# Patient Record
Sex: FEMALE | Race: WHITE | Employment: FULL TIME | URBAN - METROPOLITAN AREA
[De-identification: names, ages, dates, MRNs, and addresses within clinical notes are randomized per-mention and may not be internally consistent; named-entity substitution may affect disease eponyms.]

---

## 2017-05-30 ENCOUNTER — OFFICE VISIT (OUTPATIENT)
Dept: FAMILY MEDICINE CLINIC | Facility: CLINIC | Age: 22
End: 2017-05-30

## 2017-05-30 VITALS
RESPIRATION RATE: 16 BRPM | HEART RATE: 106 BPM | DIASTOLIC BLOOD PRESSURE: 98 MMHG | WEIGHT: 241.19 LBS | OXYGEN SATURATION: 98 % | SYSTOLIC BLOOD PRESSURE: 132 MMHG | TEMPERATURE: 98 F | HEIGHT: 67 IN | BODY MASS INDEX: 37.85 KG/M2

## 2017-05-30 DIAGNOSIS — J40 BRONCHITIS: Primary | ICD-10-CM

## 2017-05-30 DIAGNOSIS — J06.9 ACUTE URI: ICD-10-CM

## 2017-05-30 PROCEDURE — 99214 OFFICE O/P EST MOD 30 MIN: CPT | Performed by: NURSE PRACTITIONER

## 2017-05-30 RX ORDER — AZITHROMYCIN 250 MG/1
TABLET, FILM COATED ORAL
Qty: 6 TABLET | Refills: 0 | Status: SHIPPED | OUTPATIENT
Start: 2017-05-30 | End: 2017-06-04

## 2017-05-30 NOTE — PROGRESS NOTES
HPI:    Patient ID: Regi Ac is a 25year old female. HPI   Been fighting cold symptoms for at least 2 weeks. Not getting better. Bene having congestion mainly in her head. Last week moved to her chest with a bad cough. Wednesday had a fever.  Carey Govea alert and oriented to person, place, and time. Skin: Skin is warm and dry. Psychiatric: She has a normal mood and affect. Her behavior is normal. Judgment and thought content normal.   Nursing note and vitals reviewed.              ASSESSMENT/PLAN:   Monique

## 2017-05-30 NOTE — PATIENT INSTRUCTIONS
Directed to take antibiotics until gone. Recommend to eat yogurt or take probiotic daily while on antibiotic. Treat symptoms as needed. Avoid decongestants such as sudafed. Return to clinic if not better in 48-72 hours. Otherwise follow-up as needed. minutes per week. 4-9 mmHg   Moderation of alcohol consumption Men: limit to <= 2 drinks* per day. Women and lighter weight persons: limit to <= 1 drink* per day.  2-4 mmHg      * 1 drink = ½ oz or 15 mL ethanol (e.g. 12 oz beer, 5 oz wine, 1.5 oz 80-proof

## 2018-07-05 ENCOUNTER — OFFICE VISIT (OUTPATIENT)
Dept: FAMILY MEDICINE CLINIC | Facility: CLINIC | Age: 23
End: 2018-07-05

## 2018-07-05 VITALS
HEART RATE: 84 BPM | WEIGHT: 247 LBS | SYSTOLIC BLOOD PRESSURE: 120 MMHG | TEMPERATURE: 98 F | DIASTOLIC BLOOD PRESSURE: 78 MMHG | HEIGHT: 67 IN | BODY MASS INDEX: 38.77 KG/M2 | RESPIRATION RATE: 16 BRPM

## 2018-07-05 DIAGNOSIS — N92.6 IRREGULAR MENSES: ICD-10-CM

## 2018-07-05 DIAGNOSIS — Z00.00 ROUTINE GENERAL MEDICAL EXAMINATION AT A HEALTH CARE FACILITY: Primary | ICD-10-CM

## 2018-07-05 DIAGNOSIS — Z01.411 ENCOUNTER FOR GYNECOLOGICAL EXAMINATION WITH ABNORMAL FINDING: ICD-10-CM

## 2018-07-05 PROCEDURE — 88175 CYTOPATH C/V AUTO FLUID REDO: CPT | Performed by: NURSE PRACTITIONER

## 2018-07-05 PROCEDURE — 87491 CHLMYD TRACH DNA AMP PROBE: CPT | Performed by: NURSE PRACTITIONER

## 2018-07-05 PROCEDURE — 87591 N.GONORRHOEAE DNA AMP PROB: CPT | Performed by: NURSE PRACTITIONER

## 2018-07-05 PROCEDURE — 99395 PREV VISIT EST AGE 18-39: CPT | Performed by: NURSE PRACTITIONER

## 2018-07-05 RX ORDER — NORGESTIMATE AND ETHINYL ESTRADIOL 7DAYSX3 28
1 KIT ORAL DAILY
Qty: 3 PACKAGE | Refills: 3 | Status: SHIPPED | OUTPATIENT
Start: 2018-07-05 | End: 2019-05-06

## 2018-07-05 RX ORDER — AMOXICILLIN 500 MG/1
500 TABLET, FILM COATED ORAL 3 TIMES DAILY
COMMUNITY
End: 2018-08-22

## 2018-07-05 NOTE — PROGRESS NOTES
HPI:    Patient ID: Sima Castro is a 21year old female. HPI     Patient is present for a physical and her first Pap. States she has not ever been sexually active, but is getting involved in a relationship and this is being discussed.   Would like tenderness, discharge or bleeding. Cervix exhibits no motion tenderness, no discharge and no friability. Musculoskeletal: Normal range of motion. Lymphadenopathy:     She has no cervical adenopathy.    Neurological: She is alert and oriented to person,

## 2018-07-05 NOTE — PATIENT INSTRUCTIONS
Pap pending. Schedule fasting labs (nothing but water or black coffee 8 - 12 hours before labs drawn). Start birth control this Sunday. Recheck annually and as needed. Handout for birth control given.

## 2018-07-09 ENCOUNTER — TELEPHONE (OUTPATIENT)
Dept: FAMILY MEDICINE CLINIC | Facility: CLINIC | Age: 23
End: 2018-07-09

## 2018-07-09 ENCOUNTER — LABORATORY ENCOUNTER (OUTPATIENT)
Dept: LAB | Age: 23
End: 2018-07-09
Attending: NURSE PRACTITIONER
Payer: COMMERCIAL

## 2018-07-09 DIAGNOSIS — N92.6 IRREGULAR MENSES: ICD-10-CM

## 2018-07-09 DIAGNOSIS — Z00.00 ROUTINE GENERAL MEDICAL EXAMINATION AT A HEALTH CARE FACILITY: ICD-10-CM

## 2018-07-09 DIAGNOSIS — Z01.411 ENCOUNTER FOR GYNECOLOGICAL EXAMINATION WITH ABNORMAL FINDING: ICD-10-CM

## 2018-07-09 LAB
ALBUMIN SERPL-MCNC: 3.2 G/DL (ref 3.5–4.8)
ALP LIVER SERPL-CCNC: 87 U/L (ref 52–144)
ALT SERPL-CCNC: 23 U/L (ref 14–54)
AST SERPL-CCNC: 23 U/L (ref 15–41)
BASOPHILS # BLD AUTO: 0.03 X10(3) UL (ref 0–0.1)
BASOPHILS NFR BLD AUTO: 0.4 %
BILIRUB SERPL-MCNC: 0.3 MG/DL (ref 0.1–2)
BUN BLD-MCNC: 20 MG/DL (ref 8–20)
CALCIUM BLD-MCNC: 9.3 MG/DL (ref 8.3–10.3)
CHLORIDE: 109 MMOL/L (ref 101–111)
CHOLEST SMN-MCNC: 210 MG/DL (ref ?–190)
CO2: 24 MMOL/L (ref 22–32)
CREAT BLD-MCNC: 1.59 MG/DL (ref 0.55–1.02)
DEPRECATED HBV CORE AB SER IA-ACNC: 42.8 NG/ML (ref 12–114)
EOSINOPHIL # BLD AUTO: 0.7 X10(3) UL (ref 0–0.3)
EOSINOPHIL NFR BLD AUTO: 8.3 %
ERYTHROCYTE [DISTWIDTH] IN BLOOD BY AUTOMATED COUNT: 12.6 % (ref 11.5–16)
GLUCOSE BLD-MCNC: 93 MG/DL (ref 70–99)
HCT VFR BLD AUTO: 48.9 % (ref 34–50)
HDLC SERPL-MCNC: 34 MG/DL (ref 45–?)
HDLC SERPL: 6.18 {RATIO} (ref ?–4.44)
HGB BLD-MCNC: 16.1 G/DL (ref 12–16)
IMMATURE GRANULOCYTE COUNT: 0.01 X10(3) UL (ref 0–1)
IMMATURE GRANULOCYTE RATIO %: 0.1 %
IRON SATURATION: 18 % (ref 20–50)
IRON: 68 UG/DL (ref 28–170)
LDLC SERPL CALC-MCNC: 105 MG/DL (ref ?–120)
LYMPHOCYTES # BLD AUTO: 2.35 X10(3) UL (ref 0.9–4)
LYMPHOCYTES NFR BLD AUTO: 27.8 %
M PROTEIN MFR SERPL ELPH: 7.3 G/DL (ref 6.1–8.3)
MCH RBC QN AUTO: 30.6 PG (ref 27–33.2)
MCHC RBC AUTO-ENTMCNC: 32.9 G/DL (ref 31–37)
MCV RBC AUTO: 92.8 FL (ref 81–100)
MONOCYTES # BLD AUTO: 0.6 X10(3) UL (ref 0.1–1)
MONOCYTES NFR BLD AUTO: 7.1 %
NEUTROPHIL ABS PRELIM: 4.75 X10 (3) UL (ref 1.3–6.7)
NEUTROPHILS # BLD AUTO: 4.75 X10(3) UL (ref 1.3–6.7)
NEUTROPHILS NFR BLD AUTO: 56.3 %
NONHDLC SERPL-MCNC: 176 MG/DL (ref ?–150)
PLATELET # BLD AUTO: 324 10(3)UL (ref 150–450)
POTASSIUM SERPL-SCNC: 4.4 MMOL/L (ref 3.6–5.1)
RBC # BLD AUTO: 5.27 X10(6)UL (ref 3.8–5.1)
RED CELL DISTRIBUTION WIDTH-SD: 43 FL (ref 35.1–46.3)
SODIUM SERPL-SCNC: 141 MMOL/L (ref 136–144)
TOTAL IRON BINDING CAPACITY: 370 UG/DL (ref 240–450)
TRANSFERRIN: 248 MG/DL (ref 200–360)
TRIGL SERPL-MCNC: 357 MG/DL (ref ?–115)
TSI SER-ACNC: 2.43 MIU/ML (ref 0.35–5.5)
VLDLC SERPL CALC-MCNC: 71 MG/DL (ref 5–40)
WBC # BLD AUTO: 8.4 X10(3) UL (ref 4–13)

## 2018-07-09 PROCEDURE — 82728 ASSAY OF FERRITIN: CPT | Performed by: NURSE PRACTITIONER

## 2018-07-09 PROCEDURE — 83550 IRON BINDING TEST: CPT | Performed by: NURSE PRACTITIONER

## 2018-07-09 PROCEDURE — 80061 LIPID PANEL: CPT | Performed by: NURSE PRACTITIONER

## 2018-07-09 PROCEDURE — 36415 COLL VENOUS BLD VENIPUNCTURE: CPT | Performed by: NURSE PRACTITIONER

## 2018-07-09 PROCEDURE — 80050 GENERAL HEALTH PANEL: CPT | Performed by: NURSE PRACTITIONER

## 2018-07-09 PROCEDURE — 83540 ASSAY OF IRON: CPT | Performed by: NURSE PRACTITIONER

## 2018-07-09 NOTE — TELEPHONE ENCOUNTER
----- Message from Sherren Portela, APN sent at 7/9/2018 11:18 AM CDT -----  Pap is normal. Gonorrhea and chlamydia is pending. Please let patient know. Thank you.  Marleni Avalos, 07/09/18, 11:18 AM

## 2018-07-09 NOTE — TELEPHONE ENCOUNTER
Spoke with Tish London in lab who will add on [facility] to pap specimen. Will contact patient once those results are back along with lab results.

## 2018-07-10 LAB
C TRACH DNA SPEC QL NAA+PROBE: NEGATIVE
N GONORRHOEA DNA SPEC QL NAA+PROBE: NEGATIVE

## 2018-07-10 NOTE — TELEPHONE ENCOUNTER
----- Message from TALYA Gamboa sent at 7/10/2018 12:19 PM CDT -----  Labs show with CMP I decrease in kidney function. Recommend to increase water intake. Also iron saturation is slightly low. Recommend an iron rich diet.   Cholesterol levels ar

## 2018-08-22 ENCOUNTER — APPOINTMENT (OUTPATIENT)
Dept: LAB | Age: 23
End: 2018-08-22
Attending: NURSE PRACTITIONER
Payer: COMMERCIAL

## 2018-08-22 ENCOUNTER — OFFICE VISIT (OUTPATIENT)
Dept: FAMILY MEDICINE CLINIC | Facility: CLINIC | Age: 23
End: 2018-08-22
Payer: COMMERCIAL

## 2018-08-22 VITALS
TEMPERATURE: 100 F | WEIGHT: 239.38 LBS | HEART RATE: 128 BPM | DIASTOLIC BLOOD PRESSURE: 82 MMHG | SYSTOLIC BLOOD PRESSURE: 120 MMHG | BODY MASS INDEX: 38 KG/M2 | RESPIRATION RATE: 20 BRPM

## 2018-08-22 DIAGNOSIS — A60.00 GENITAL HERPES SIMPLEX, UNSPECIFIED SITE: Primary | ICD-10-CM

## 2018-08-22 DIAGNOSIS — A60.00 GENITAL HERPES SIMPLEX, UNSPECIFIED SITE: ICD-10-CM

## 2018-08-22 DIAGNOSIS — Z11.3 SCREENING FOR STD (SEXUALLY TRANSMITTED DISEASE): ICD-10-CM

## 2018-08-22 DIAGNOSIS — N76.0 ACUTE VAGINITIS: ICD-10-CM

## 2018-08-22 PROCEDURE — 87510 GARDNER VAG DNA DIR PROBE: CPT | Performed by: NURSE PRACTITIONER

## 2018-08-22 PROCEDURE — 99215 OFFICE O/P EST HI 40 MIN: CPT | Performed by: NURSE PRACTITIONER

## 2018-08-22 PROCEDURE — 86694 HERPES SIMPLEX NES ANTBDY: CPT | Performed by: NURSE PRACTITIONER

## 2018-08-22 PROCEDURE — 87660 TRICHOMONAS VAGIN DIR PROBE: CPT | Performed by: NURSE PRACTITIONER

## 2018-08-22 PROCEDURE — 87529 HSV DNA AMP PROBE: CPT | Performed by: NURSE PRACTITIONER

## 2018-08-22 PROCEDURE — 87591 N.GONORRHOEAE DNA AMP PROB: CPT | Performed by: NURSE PRACTITIONER

## 2018-08-22 PROCEDURE — 87480 CANDIDA DNA DIR PROBE: CPT | Performed by: NURSE PRACTITIONER

## 2018-08-22 PROCEDURE — 36415 COLL VENOUS BLD VENIPUNCTURE: CPT | Performed by: NURSE PRACTITIONER

## 2018-08-22 PROCEDURE — 87491 CHLMYD TRACH DNA AMP PROBE: CPT | Performed by: NURSE PRACTITIONER

## 2018-08-22 RX ORDER — VALACYCLOVIR HYDROCHLORIDE 1 G/1
1 TABLET, FILM COATED ORAL DAILY
Qty: 30 TABLET | Refills: 6 | Status: SHIPPED | OUTPATIENT
Start: 2018-08-22 | End: 2019-02-04

## 2018-08-22 RX ORDER — VALACYCLOVIR HYDROCHLORIDE 1 G/1
1 TABLET, FILM COATED ORAL 3 TIMES DAILY
Qty: 21 TABLET | Refills: 0 | Status: SHIPPED | OUTPATIENT
Start: 2018-08-22 | End: 2018-08-29

## 2018-08-22 RX ORDER — SULFAMETHOXAZOLE AND TRIMETHOPRIM 800; 160 MG/1; MG/1
1 TABLET ORAL 2 TIMES DAILY
COMMUNITY
Start: 2018-08-18 | End: 2019-10-09

## 2018-08-22 NOTE — PATIENT INSTRUCTIONS
Vaginitis swab obtained today will check for yeast, bacterial vaginosis, trichomonas. Also obtained culture for gonorrhea and chlamydia. Also obtain culture for herpes virus.     We will obtain blood work today for herpes to see if you have had this i upset can lead to outbreaks of sores. Exposure to strong sunlight often causes herpes sores to reappear.   To help prevent outbreaks  · To prevent oral herpes outbreaks, avoid overexposure to wind, sun, and extreme temperatures.  Use sunscreen and lip balm a substitute for professional medical care. Always follow your healthcare professional's instructions.

## 2018-08-22 NOTE — PROGRESS NOTES
Patient ID:   Gabriel Mcgregor  is a 21year old female    Allergies    No Known Allergies        Medications     Sulfamethoxazole-TMP -160 MG Oral Tab per tablet Take 1 tablet by mouth 2 (two) times daily.  Disp:  Rfl:    ValACYclovir HCl 1 G Oral Ta Vagina: no evidence of rectocele or cystocele moderate amount of yellow, watery discharge noted with some erythema. Cervix: Vesicular lesion noted to cervix not friable, no cervical motion tenderness .    Uterus: Normal size non tender no masses   Adne Herpes is a chronic (lifelong) virus. It can cause sores and discomfort. You get it from contact with someone who carries the virus. If sores occur on the lips, you have oral herpes.  If sores occur on the penis or around the vagina, you have genital herpes · Tell any new partners that you have herpes. · If you’re a woman, have Pap tests as often as your healthcare provider recommends. · A woman can spread herpes to their  during the birth process, whether or not they have an active genital sore.  If

## 2018-08-23 ENCOUNTER — TELEPHONE (OUTPATIENT)
Dept: FAMILY MEDICINE CLINIC | Facility: CLINIC | Age: 23
End: 2018-08-23

## 2018-08-23 RX ORDER — LEVOFLOXACIN 500 MG/1
500 TABLET, FILM COATED ORAL DAILY
Qty: 7 TABLET | Refills: 0 | Status: SHIPPED | OUTPATIENT
Start: 2018-08-23 | End: 2018-08-30

## 2018-08-23 NOTE — TELEPHONE ENCOUNTER
----- Message from TALYA Rojas sent at 8/23/2018 11:22 AM CDT -----  Please notify patient that her vaginitis swab came back negative for yeast, bacterial vaginosis, trichomonas. Her gonorrhea chlamydia came back as indeterminate.   This is likely

## 2018-08-23 NOTE — TELEPHONE ENCOUNTER
Pt informed, states she is in more pain. Pt states she was shivering/sweating last night. Pt states her s/s feel like UTI s/s. Called [facility] for urine results. Awaiting reports.

## 2018-08-23 NOTE — TELEPHONE ENCOUNTER
Please notify patient that her urine culture that [facility] does not show a urinary tract infection. It shows a small amount of growth of group B strep which is typically colonized in the vagina.   Since patient was having vagin

## 2018-08-24 LAB
HSV 1 SUBTYPE BY PCR: NOT DETECTED
HSV 2 SUBTYPE BY PCR: DETECTED

## 2018-08-25 ENCOUNTER — TELEPHONE (OUTPATIENT)
Dept: FAMILY MEDICINE CLINIC | Facility: CLINIC | Age: 23
End: 2018-08-25

## 2018-08-25 LAB
HSV TYPE 1/2 COMBINED AB, IGG: 0.66 IV
HSV TYPE 1/2 COMBINED ABS, IGM: 0.6 IV

## 2018-08-25 NOTE — TELEPHONE ENCOUNTER
----- Message from TALYA Barnes sent at 8/25/2018 10:33 AM CDT -----  Blood tests for herpes is negative–see result note for herpes culture–this means this is a first outbreak.

## 2018-08-25 NOTE — TELEPHONE ENCOUNTER
----- Message from TALYA Kirby sent at 8/25/2018 10:35 AM CDT -----  Please notify patient that the culture that we took from her genital area shows that she does have herpes type II.   This is traditionally the genital herpes, and it tends to recu

## 2019-02-04 ENCOUNTER — TELEPHONE (OUTPATIENT)
Dept: FAMILY MEDICINE CLINIC | Facility: CLINIC | Age: 24
End: 2019-02-04

## 2019-02-04 RX ORDER — VALACYCLOVIR HYDROCHLORIDE 1 G/1
1 TABLET, FILM COATED ORAL DAILY
Qty: 30 TABLET | Refills: 6 | Status: SHIPPED | OUTPATIENT
Start: 2019-02-04 | End: 2019-10-09

## 2019-02-04 NOTE — TELEPHONE ENCOUNTER
Received a fax for valacyclovir refill    No future appointments.     Future appt:    Last Appointment:  Visit date not found  Cholesterol, Total (mg/dL)   Date Value   07/09/2018 210 (H)     HDL Cholesterol (mg/dL)   Date Value   07/09/2018 34 (L)     LDL

## 2019-04-29 RX ORDER — NORGESTIMATE AND ETHINYL ESTRADIOL 7DAYSX3 28
KIT ORAL
Qty: 28 TABLET | Refills: 8 | OUTPATIENT
Start: 2019-04-29

## 2019-05-06 RX ORDER — NORGESTIMATE AND ETHINYL ESTRADIOL 7DAYSX3 28
1 KIT ORAL DAILY
Qty: 3 PACKAGE | Refills: 0 | Status: SHIPPED | OUTPATIENT
Start: 2019-05-06 | End: 2019-07-25

## 2019-07-25 RX ORDER — NORGESTIMATE AND ETHINYL ESTRADIOL 7DAYSX3 28
KIT ORAL
Qty: 84 TABLET | Refills: 0 | Status: SHIPPED | OUTPATIENT
Start: 2019-07-25 | End: 2019-10-09

## 2019-07-25 NOTE — TELEPHONE ENCOUNTER
Future appt:    Last Appointment with Provider:  8/22/2018    Last Appointment at [facility]:  Visit date not found    Lab Results   Component Value Date     07/09/2018    K 4.4 07/09/2018    BUN 20 07/09/2018    CREATSERUM 1.59 (H) 07/09/2018    A

## 2019-10-09 ENCOUNTER — OFFICE VISIT (OUTPATIENT)
Dept: FAMILY MEDICINE CLINIC | Facility: CLINIC | Age: 24
End: 2019-10-09
Payer: COMMERCIAL

## 2019-10-09 VITALS
TEMPERATURE: 98 F | HEIGHT: 67 IN | BODY MASS INDEX: 35.79 KG/M2 | DIASTOLIC BLOOD PRESSURE: 100 MMHG | HEART RATE: 101 BPM | WEIGHT: 228 LBS | SYSTOLIC BLOOD PRESSURE: 148 MMHG | OXYGEN SATURATION: 98 %

## 2019-10-09 DIAGNOSIS — R03.0 ELEVATED BLOOD PRESSURE READING: ICD-10-CM

## 2019-10-09 DIAGNOSIS — Z01.419 ENCOUNTER FOR GYNECOLOGICAL EXAMINATION: ICD-10-CM

## 2019-10-09 DIAGNOSIS — Z23 NEED FOR VACCINATION: ICD-10-CM

## 2019-10-09 DIAGNOSIS — A60.00 GENITAL HERPES SIMPLEX, UNSPECIFIED SITE: ICD-10-CM

## 2019-10-09 DIAGNOSIS — M54.50 LEFT-SIDED LOW BACK PAIN WITHOUT SCIATICA, UNSPECIFIED CHRONICITY: ICD-10-CM

## 2019-10-09 DIAGNOSIS — Z00.00 ENCOUNTER FOR HEALTH MAINTENANCE EXAMINATION IN ADULT: Primary | ICD-10-CM

## 2019-10-09 DIAGNOSIS — R10.32 LEFT LOWER QUADRANT ABDOMINAL PAIN: ICD-10-CM

## 2019-10-09 PROCEDURE — 87591 N.GONORRHOEAE DNA AMP PROB: CPT | Performed by: NURSE PRACTITIONER

## 2019-10-09 PROCEDURE — 90471 IMMUNIZATION ADMIN: CPT | Performed by: NURSE PRACTITIONER

## 2019-10-09 PROCEDURE — 99213 OFFICE O/P EST LOW 20 MIN: CPT | Performed by: NURSE PRACTITIONER

## 2019-10-09 PROCEDURE — 99395 PREV VISIT EST AGE 18-39: CPT | Performed by: NURSE PRACTITIONER

## 2019-10-09 PROCEDURE — 90715 TDAP VACCINE 7 YRS/> IM: CPT | Performed by: NURSE PRACTITIONER

## 2019-10-09 PROCEDURE — 90686 IIV4 VACC NO PRSV 0.5 ML IM: CPT | Performed by: NURSE PRACTITIONER

## 2019-10-09 PROCEDURE — 87491 CHLMYD TRACH DNA AMP PROBE: CPT | Performed by: NURSE PRACTITIONER

## 2019-10-09 PROCEDURE — 90472 IMMUNIZATION ADMIN EACH ADD: CPT | Performed by: NURSE PRACTITIONER

## 2019-10-09 PROCEDURE — 88175 CYTOPATH C/V AUTO FLUID REDO: CPT | Performed by: NURSE PRACTITIONER

## 2019-10-09 RX ORDER — NORGESTIMATE AND ETHINYL ESTRADIOL 7DAYSX3 28
1 KIT ORAL
Qty: 84 TABLET | Refills: 12 | Status: SHIPPED | OUTPATIENT
Start: 2019-10-09 | End: 2020-12-11

## 2019-10-09 RX ORDER — VALACYCLOVIR HYDROCHLORIDE 1 G/1
1 TABLET, FILM COATED ORAL DAILY
Qty: 90 TABLET | Refills: 12 | Status: SHIPPED | OUTPATIENT
Start: 2019-10-09 | End: 2020-12-11

## 2019-10-09 NOTE — PROGRESS NOTES
HPI:    Patient ID: Artie Gomez is a 25year old female. HPI -patient went to urgent care recently with sinus infection. States she was given amoxicillin, Singulair, Astelin nasal spray.   States she has taken a nasal decongestant over-the-counter Constitutional: She is oriented to person, place, and time. Vital signs are normal. She appears well-developed and well-nourished. No distress. HENT:   Head: Normocephalic and atraumatic.    Right Ear: Hearing, tympanic membrane, external ear and ear Gordon Slightly tender to left lower quadrant with palpation–no guarding, no rebound, no masses. Genitourinary: Vagina normal and uterus normal. No breast swelling, tenderness or discharge. Pelvic exam was performed with patient supine.  There is no lesion on th Meds This Visit:  Requested Prescriptions     Signed Prescriptions Disp Refills   • Norgestim-Eth Estrad Triphasic (TRI FEMYNOR) 0.18/0.215/0.25 MG-35 MCG Oral Tab 84 tablet 12     Sig: Take 1 tablet by mouth once daily.    • valACYclovir HCl 1 G Oral Tab 9

## 2019-10-09 NOTE — PATIENT INSTRUCTIONS
Pap smear obtained today with gonorrhea chlamydia test.  Results to be back within a week and will notify you either way. Flu shot today–recommend every fall.     Tetanus shot today (tetanus, diphtheria, pertussis/Boostrix) today–she will be due again in

## 2019-10-11 ENCOUNTER — TELEPHONE (OUTPATIENT)
Dept: FAMILY MEDICINE CLINIC | Facility: CLINIC | Age: 24
End: 2019-10-11

## 2019-10-11 NOTE — TELEPHONE ENCOUNTER
----- Message from JOHNSON Cage sent at 10/10/2019 11:04 PM CDT -----  Please notify patient that gonorrhea and chlamydia is negative. Thank you.

## 2019-10-15 ENCOUNTER — TELEPHONE (OUTPATIENT)
Dept: FAMILY MEDICINE CLINIC | Facility: CLINIC | Age: 24
End: 2019-10-15

## 2019-10-15 NOTE — TELEPHONE ENCOUNTER
----- Message from JOHNSON Pelayo sent at 10/15/2019 12:30 PM CDT -----  Please notify patient that her Pap smear is within normal limits. Recommend annual physical, will discuss need for Pap at physical next year. Thank you.

## 2020-01-10 ENCOUNTER — TELEPHONE (OUTPATIENT)
Dept: FAMILY MEDICINE CLINIC | Facility: CLINIC | Age: 25
End: 2020-01-10

## 2020-01-10 RX ORDER — NORGESTIMATE AND ETHINYL ESTRADIOL 7DAYSX3 28
1 KIT ORAL DAILY
Qty: 3 PACKAGE | Refills: 12 | Status: SHIPPED | OUTPATIENT
Start: 2020-01-10 | End: 2020-02-07 | Stop reason: WASHOUT

## 2020-01-10 NOTE — TELEPHONE ENCOUNTER
Insurance no longer covers [facility]. Requesting change to:  Tri Previfem or Ortho Novum Generic or  Norg-EE    Please advise.   Teresa Host, 01/10/20, 3:41 PM

## 2020-12-11 ENCOUNTER — TELEPHONE (OUTPATIENT)
Dept: FAMILY MEDICINE CLINIC | Facility: CLINIC | Age: 25
End: 2020-12-11

## 2020-12-11 RX ORDER — VALACYCLOVIR HYDROCHLORIDE 1 G/1
1 TABLET, FILM COATED ORAL DAILY
Qty: 90 TABLET | Refills: 0 | Status: SHIPPED | OUTPATIENT
Start: 2020-12-11 | End: 2021-03-14

## 2020-12-11 RX ORDER — NORGESTIMATE AND ETHINYL ESTRADIOL 7DAYSX3 28
1 KIT ORAL
Qty: 84 TABLET | Refills: 0 | Status: SHIPPED | OUTPATIENT
Start: 2020-12-11 | End: 2021-03-15

## 2020-12-11 NOTE — TELEPHONE ENCOUNTER
Patient is requesting a refill of her Valcyclovir ahead of her scheduled physical on 12/16/20. Future appt:     Your appointments     Date & Time Appointment Department [facility])    Dec 16, 2020  8:30 AM CST Physical - Established with Estrada Avalos A

## 2020-12-11 NOTE — TELEPHONE ENCOUNTER
Patient has not been seen in >1 year and needs an appt. LM for patient to return call.      Future appt:    Last Appointment with provider:  10/9/2019    Last appointment at [facility] [place]:  Visit date not found  Cholesterol, Total (mg/dL)   Date Value   07/

## 2020-12-11 NOTE — TELEPHONE ENCOUNTER
RF     BCP  /  Valcyclovir   to [facility] [place]   has px on 12/16           Future Appointments   Date Time Provider Eunice Graves   12/16/2020  8:30 AM Felicia Avalos APRN [facility] [place]

## 2020-12-14 RX ORDER — VALACYCLOVIR HYDROCHLORIDE 1 G/1
TABLET, FILM COATED ORAL
Qty: 90 TABLET | Refills: 0 | OUTPATIENT
Start: 2020-12-14

## 2020-12-14 NOTE — TELEPHONE ENCOUNTER
Patient called and scheduled a physical.  RF can be given at that time.      Future Appointments   Date Time Provider Eunice Graves   12/16/2020  8:30 AM Lyssa Avalos APRN [facility] [place]

## 2020-12-16 ENCOUNTER — LAB ENCOUNTER (OUTPATIENT)
Dept: LAB | Age: 25
End: 2020-12-16
Attending: NURSE PRACTITIONER
Payer: COMMERCIAL

## 2020-12-16 ENCOUNTER — OFFICE VISIT (OUTPATIENT)
Dept: FAMILY MEDICINE CLINIC | Facility: CLINIC | Age: 25
End: 2020-12-16
Payer: COMMERCIAL

## 2020-12-16 VITALS
OXYGEN SATURATION: 99 % | DIASTOLIC BLOOD PRESSURE: 102 MMHG | RESPIRATION RATE: 16 BRPM | SYSTOLIC BLOOD PRESSURE: 140 MMHG | WEIGHT: 231.81 LBS | TEMPERATURE: 97 F | BODY MASS INDEX: 36.38 KG/M2 | HEART RATE: 94 BPM | HEIGHT: 67 IN

## 2020-12-16 DIAGNOSIS — E28.2 PCOS (POLYCYSTIC OVARIAN SYNDROME): ICD-10-CM

## 2020-12-16 DIAGNOSIS — K21.00 GASTROESOPHAGEAL REFLUX DISEASE WITH ESOPHAGITIS WITHOUT HEMORRHAGE: ICD-10-CM

## 2020-12-16 DIAGNOSIS — R03.0 ELEVATED BLOOD PRESSURE READING: ICD-10-CM

## 2020-12-16 DIAGNOSIS — Z00.00 WELLNESS EXAMINATION: ICD-10-CM

## 2020-12-16 DIAGNOSIS — K59.1 FUNCTIONAL DIARRHEA: ICD-10-CM

## 2020-12-16 DIAGNOSIS — Z13.220 LIPID SCREENING: ICD-10-CM

## 2020-12-16 DIAGNOSIS — R79.0 LOW IRON STORES: ICD-10-CM

## 2020-12-16 DIAGNOSIS — Z00.00 WELLNESS EXAMINATION: Primary | ICD-10-CM

## 2020-12-16 DIAGNOSIS — Z13.0 SCREENING FOR DEFICIENCY ANEMIA: ICD-10-CM

## 2020-12-16 DIAGNOSIS — Z13.29 THYROID DISORDER SCREEN: ICD-10-CM

## 2020-12-16 PROCEDURE — 83001 ASSAY OF GONADOTROPIN (FSH): CPT | Performed by: NURSE PRACTITIONER

## 2020-12-16 PROCEDURE — 82306 VITAMIN D 25 HYDROXY: CPT | Performed by: NURSE PRACTITIONER

## 2020-12-16 PROCEDURE — 99395 PREV VISIT EST AGE 18-39: CPT | Performed by: NURSE PRACTITIONER

## 2020-12-16 PROCEDURE — 80061 LIPID PANEL: CPT | Performed by: NURSE PRACTITIONER

## 2020-12-16 PROCEDURE — 83721 ASSAY OF BLOOD LIPOPROTEIN: CPT | Performed by: NURSE PRACTITIONER

## 2020-12-16 PROCEDURE — 3008F BODY MASS INDEX DOCD: CPT | Performed by: NURSE PRACTITIONER

## 2020-12-16 PROCEDURE — 36415 COLL VENOUS BLD VENIPUNCTURE: CPT | Performed by: NURSE PRACTITIONER

## 2020-12-16 PROCEDURE — 99212 OFFICE O/P EST SF 10 MIN: CPT | Performed by: NURSE PRACTITIONER

## 2020-12-16 PROCEDURE — 3077F SYST BP >= 140 MM HG: CPT | Performed by: NURSE PRACTITIONER

## 2020-12-16 PROCEDURE — 84403 ASSAY OF TOTAL TESTOSTERONE: CPT | Performed by: NURSE PRACTITIONER

## 2020-12-16 PROCEDURE — 81001 URINALYSIS AUTO W/SCOPE: CPT | Performed by: NURSE PRACTITIONER

## 2020-12-16 PROCEDURE — 80050 GENERAL HEALTH PANEL: CPT | Performed by: NURSE PRACTITIONER

## 2020-12-16 PROCEDURE — 82728 ASSAY OF FERRITIN: CPT | Performed by: NURSE PRACTITIONER

## 2020-12-16 PROCEDURE — 82607 VITAMIN B-12: CPT | Performed by: NURSE PRACTITIONER

## 2020-12-16 PROCEDURE — 83550 IRON BINDING TEST: CPT | Performed by: NURSE PRACTITIONER

## 2020-12-16 PROCEDURE — 83540 ASSAY OF IRON: CPT | Performed by: NURSE PRACTITIONER

## 2020-12-16 PROCEDURE — 3080F DIAST BP >= 90 MM HG: CPT | Performed by: NURSE PRACTITIONER

## 2020-12-16 PROCEDURE — 83002 ASSAY OF GONADOTROPIN (LH): CPT | Performed by: NURSE PRACTITIONER

## 2020-12-16 RX ORDER — PROPRANOLOL HCL 60 MG
60 CAPSULE, EXTENDED RELEASE 24HR ORAL DAILY
Qty: 30 CAPSULE | Refills: 0 | Status: SHIPPED | OUTPATIENT
Start: 2020-12-16 | End: 2021-01-27

## 2020-12-16 NOTE — PATIENT INSTRUCTIONS
Labs pending    Referral to GI - Dr. Maria C Tucker. Will treat based on lab results. Focus on a healthy diet and exercise. Start propranolol. Recheck 2 weeks - check blood pressure at home.    Managing High Blood Pressure with the DASH Diet  What y reduce blood pressure. Those nutrients include calcium, potassium, magnesium, protein, and fiber, as well as lower total fat and saturated fat. The DASH diet is naturally low in salt. The DASH diet recommends menus containing 2,300 mg of sodium.  The lower Don't have fats that are saturated (animal fats) or trans fats. · Five or fewer servings per week of sweets like maple syrup, sorbet, or gelatin. A serving is 1 tablespoon sugar, 1 tablespoon jelly or jam, half-ounce jellybeans, or 8 ounces of lemonade. and other lifestyle factors to reduce hypertension, visit Your Guide to Lowering Blood Pressure. © 9585-0602 The [address] [facility] [place]. All rights reserved.  This information is not intended as a substitute for pr

## 2020-12-16 NOTE — PROGRESS NOTES
HPI:   Kala Flor is a 22year old female who presents for an Annual Health Visit. States that she is under stress especially at work. Works in the [employer] at [employer]. Has been getting hair on her chin and neck.  No vaginal wetn Gastrointestinal: Positive for abdominal pain, diarrhea and nausea. Genitourinary: Negative. Musculoskeletal: Negative. Skin: Negative. Neurological: Positive for light-headedness (Occurs on the first couple days of her. ).    Psychiatric/Behavior Musculoskeletal: Normal range of motion. Lymphadenopathy:     She has no cervical adenopathy. Neurological: She is alert and oriented to person, place, and time. Skin: Skin is warm and dry.    Hair growth noted more on the right side of her face start -     LH (LUTEINIZING HORMONE)    Low iron stores  -     IRON AND TIBC;  Future  -     FERRITIN; Future  -     IRON AND TIBC  -     FERRITIN    Gastroesophageal reflux disease with esophagitis without hemorrhage  -     GASTRO - EXTERNAL    Functional diarrh · Stage 1 high blood pressure is systolic is 237 to 467 or diastolic between 80 to 89  · Stage 2 high blood pressure is when systolic is 169 or higher or the diastolic is 90 or higher  High blood pressure is diagnosed when multiple, separate readings show · Six or fewer daily servings of lean meat, poultry, or fish. A serving is 1 ounce of cooked meats, skinless poultry, or fish. · Four to 5 servings per week of nuts, seeds, and dry beans.  A serving is one-third cup or 1½ ounces of nuts, 1 tablespoon or ha If you decide to go it alone, adopt the DASH diet gradually. By doing so, you'll be more likely to stick to it long-term.  For example, add 1 more serving of vegetables at lunch and dinner if you eat only 1 or 2 servings a day now. You might also add fruit

## 2020-12-17 ENCOUNTER — TELEPHONE (OUTPATIENT)
Dept: FAMILY MEDICINE CLINIC | Facility: CLINIC | Age: 25
End: 2020-12-17

## 2020-12-17 DIAGNOSIS — E53.8 LOW SERUM VITAMIN B12: ICD-10-CM

## 2020-12-17 DIAGNOSIS — N28.9 DECREASED RENAL FUNCTION: Primary | ICD-10-CM

## 2020-12-17 RX ORDER — CYANOCOBALAMIN 1000 UG/ML
1000 INJECTION INTRAMUSCULAR; SUBCUTANEOUS ONCE
Status: COMPLETED | OUTPATIENT
Start: 2020-12-18 | End: 2020-12-18

## 2020-12-17 RX ORDER — ERGOCALCIFEROL 1.25 MG/1
50000 CAPSULE ORAL WEEKLY
Qty: 12 CAPSULE | Refills: 0 | Status: SHIPPED | OUTPATIENT
Start: 2020-12-17 | End: 2021-03-05

## 2020-12-17 RX ORDER — IRON,CARBONYL/ASCORBIC ACID 100-250 MG
1 TABLET ORAL 2 TIMES DAILY
COMMUNITY
Start: 2020-12-17 | End: 2021-01-08

## 2020-12-17 NOTE — TELEPHONE ENCOUNTER
----- Message from JOHNSON Jansen sent at 12/17/2020  9:43 AM CST -----  Vitamin D is very low. Recommend 50,000 units weekly for 12 weeks. B12 is also low. Consulted with Dr. Aileen Barrera. Recommends to do a B12 injection to help boost this.   Iron level

## 2020-12-17 NOTE — TELEPHONE ENCOUNTER
Marcella wants her to have the [address] asap, next date tech is here is not until We., Dec. 23rd  No future appointments.

## 2020-12-18 ENCOUNTER — NURSE ONLY (OUTPATIENT)
Dept: FAMILY MEDICINE CLINIC | Facility: CLINIC | Age: 25
End: 2020-12-18
Payer: COMMERCIAL

## 2020-12-18 PROCEDURE — 96372 THER/PROPH/DIAG INJ SC/IM: CPT | Performed by: NURSE PRACTITIONER

## 2020-12-18 RX ADMIN — CYANOCOBALAMIN 1000 MCG: 1000 INJECTION INTRAMUSCULAR; SUBCUTANEOUS at 08:52:00

## 2020-12-18 NOTE — PROGRESS NOTES
Patient here for B12 injection as ordered by Taran ORNELAS. Only a single dose has been ordered for patient. ( this is not a continuous monthly dosing)     B12 1000mcg given left deltoid IM. Well tolerated by patient.

## 2020-12-21 ENCOUNTER — TELEPHONE (OUTPATIENT)
Dept: FAMILY MEDICINE CLINIC | Facility: CLINIC | Age: 25
End: 2020-12-21

## 2020-12-21 DIAGNOSIS — N28.9 ABNORMAL RENAL FUNCTION: Primary | ICD-10-CM

## 2020-12-21 DIAGNOSIS — N26.1 RENAL ATROPHY, RIGHT: ICD-10-CM

## 2020-12-21 NOTE — TELEPHONE ENCOUNTER
Spoke with patient regarding the below. She has an appt with Dr. Sulma Blackman 1/20. Verbalized understanding of the below instructions.

## 2020-12-21 NOTE — TELEPHONE ENCOUNTER
Please notify patient that the ultrasound that was done on her kidneys show that her right kidney is atrophied (shrunken) this along with her decreased kidney function–I would recommend that she sees a nephrologist.- referral entered for Dr. Tawny Ann- please g

## 2020-12-29 ENCOUNTER — TELEPHONE (OUTPATIENT)
Dept: FAMILY MEDICINE CLINIC | Facility: CLINIC | Age: 25
End: 2020-12-29

## 2020-12-29 NOTE — TELEPHONE ENCOUNTER
Has requestions regarding ultrasound. Seeing Dr. Miguel Dent on 1/5 and unsure of where results need to be sent.

## 2020-12-29 NOTE — TELEPHONE ENCOUNTER
Patient has appt with Radha Moran in [place] 1/5/21. [facility].     US Report faxed to [phone]  Tristian Gupta, 12/29/20, 4:15 PM

## 2021-01-05 ENCOUNTER — OFFICE VISIT (OUTPATIENT)
Dept: NEPHROLOGY | Facility: CLINIC | Age: 26
End: 2021-01-05
Payer: COMMERCIAL

## 2021-01-05 VITALS — WEIGHT: 235.81 LBS | DIASTOLIC BLOOD PRESSURE: 76 MMHG | BODY MASS INDEX: 37 KG/M2 | SYSTOLIC BLOOD PRESSURE: 124 MMHG

## 2021-01-05 DIAGNOSIS — N18.4 STAGE 4 CHRONIC KIDNEY DISEASE (HCC): ICD-10-CM

## 2021-01-05 DIAGNOSIS — R80.9 PROTEINURIA, UNSPECIFIED TYPE: Primary | ICD-10-CM

## 2021-01-05 PROCEDURE — 3074F SYST BP LT 130 MM HG: CPT | Performed by: INTERNAL MEDICINE

## 2021-01-05 PROCEDURE — 3078F DIAST BP <80 MM HG: CPT | Performed by: INTERNAL MEDICINE

## 2021-01-05 PROCEDURE — 99205 OFFICE O/P NEW HI 60 MIN: CPT | Performed by: INTERNAL MEDICINE

## 2021-01-05 RX ORDER — ERGOCALCIFEROL (VITAMIN D2) 1250 MCG
CAPSULE ORAL
Qty: 12 CAPSULE | Refills: 0 | Status: SHIPPED | OUTPATIENT
Start: 2021-01-05 | End: 2021-01-08

## 2021-01-05 NOTE — PROGRESS NOTES
Nephrology Consult Note      REASON FOR CONSULT:  Abnormal Renal US; Elevated Cr; proteinuria         HPI:   Rashid Fontana is a 22year old female with Patient presents with:   Other: renal cortical atrophy    Karina Dobson MD    21 y/o female with no s Occupational History      Occupation:         Comment: [employer]     Social Needs      Financial resource strain: Not hard at all      Food insecurity        Worry: Never true        Inability: Not on file      Transportation needs        Medical: No Range:  3.5 - 5.1 mmol/L 4.6     Chloride Latest Ref Range: 98 - 112 mmol/L 112     Carbon Dioxide, Total Latest Ref Range: 21.0 - 32.0 mmol/L 21.0     BUN Latest Ref Range: 7 - 18 mg/dL 28 (H)     CREATININE Latest Ref Range: 0.55 - 1.02 mg/dL 2.37 (H) female sex mIU/mL 3.2     LH Latest Ref Range: No established range for female sex mIU/mL 7.8     TESTOSTERONE Latest Ref Range: Male 123..86 : Female (No Reference Range) ng/dL 41.48     WBC Latest Ref Range: 4.0 - 11.0 x10(3) uL 7.7     Hemoglobin Range: 0.2 - 2.0 mg/dL   <2.0   Nitrite Urine Latest Ref Range: Negative    Negative   Leukocyte Esterase Urine Latest Ref Range: Negative    Negative   WBC Urine Latest Ref Range: <5 /HPF   1-4   RBC URINE Latest Ref Range: 0 - 2 /HPF   0-2   Bacteria Devante Moya

## 2021-01-06 ENCOUNTER — LAB ENCOUNTER (OUTPATIENT)
Dept: LAB | Age: 26
End: 2021-01-06
Attending: INTERNAL MEDICINE
Payer: COMMERCIAL

## 2021-01-06 DIAGNOSIS — N18.4 STAGE 4 CHRONIC KIDNEY DISEASE (HCC): ICD-10-CM

## 2021-01-06 DIAGNOSIS — R80.9 PROTEINURIA, UNSPECIFIED TYPE: ICD-10-CM

## 2021-01-06 LAB
ANION GAP SERPL CALC-SCNC: 10 MMOL/L (ref 0–18)
BILIRUB UR QL STRIP.AUTO: NEGATIVE
BUN BLD-MCNC: 30 MG/DL (ref 7–18)
BUN/CREAT SERPL: 13.3 (ref 10–20)
C3 SERPL-MCNC: 172 MG/DL (ref 90–180)
C4 SERPL-MCNC: 40.4 MG/DL (ref 10–40)
CALCIUM BLD-MCNC: 9.3 MG/DL (ref 8.5–10.1)
CHLORIDE SERPL-SCNC: 108 MMOL/L (ref 98–112)
CLARITY UR REFRACT.AUTO: CLEAR
CO2 SERPL-SCNC: 20 MMOL/L (ref 21–32)
CREAT BLD-MCNC: 2.26 MG/DL
CREAT UR-SCNC: 59.9 MG/DL
GLUCOSE BLD-MCNC: 112 MG/DL (ref 70–99)
GLUCOSE UR STRIP.AUTO-MCNC: NEGATIVE MG/DL
HAV IGM SER QL: 2 MG/DL (ref 1.6–2.6)
KETONES UR STRIP.AUTO-MCNC: NEGATIVE MG/DL
LEUKOCYTE ESTERASE UR QL STRIP.AUTO: NEGATIVE
NITRITE UR QL STRIP.AUTO: NEGATIVE
OSMOLALITY SERPL CALC.SUM OF ELEC: 293 MOSM/KG (ref 275–295)
PATIENT FASTING Y/N/NP: NO
PH UR STRIP.AUTO: 6 [PH] (ref 4.5–8)
PHOSPHATE SERPL-MCNC: 3.9 MG/DL (ref 2.5–4.9)
POTASSIUM SERPL-SCNC: 4.4 MMOL/L (ref 3.5–5.1)
PROT UR STRIP.AUTO-MCNC: >=500 MG/DL
PROT UR-MCNC: 405.1 MG/DL
PTH-INTACT SERPL-MCNC: 209.5 PG/ML (ref 18.5–88)
RBC UR QL AUTO: NEGATIVE
SODIUM SERPL-SCNC: 138 MMOL/L (ref 136–145)
SP GR UR STRIP.AUTO: 1.01 (ref 1–1.03)
URATE SERPL-MCNC: 6.1 MG/DL
UROBILINOGEN UR STRIP.AUTO-MCNC: <2 MG/DL
VIT D+METAB SERPL-MCNC: 16.5 NG/ML (ref 30–100)

## 2021-01-06 PROCEDURE — 86038 ANTINUCLEAR ANTIBODIES: CPT | Performed by: INTERNAL MEDICINE

## 2021-01-06 PROCEDURE — 86225 DNA ANTIBODY NATIVE: CPT | Performed by: INTERNAL MEDICINE

## 2021-01-06 PROCEDURE — 83876 ASSAY MYELOPEROXIDASE: CPT | Performed by: INTERNAL MEDICINE

## 2021-01-06 PROCEDURE — 86255 FLUORESCENT ANTIBODY SCREEN: CPT | Performed by: INTERNAL MEDICINE

## 2021-01-06 PROCEDURE — 84156 ASSAY OF PROTEIN URINE: CPT | Performed by: INTERNAL MEDICINE

## 2021-01-06 PROCEDURE — 82306 VITAMIN D 25 HYDROXY: CPT | Performed by: INTERNAL MEDICINE

## 2021-01-06 PROCEDURE — 36415 COLL VENOUS BLD VENIPUNCTURE: CPT | Performed by: INTERNAL MEDICINE

## 2021-01-06 PROCEDURE — 82570 ASSAY OF URINE CREATININE: CPT | Performed by: INTERNAL MEDICINE

## 2021-01-06 PROCEDURE — 86160 COMPLEMENT ANTIGEN: CPT | Performed by: INTERNAL MEDICINE

## 2021-01-06 PROCEDURE — 83970 ASSAY OF PARATHORMONE: CPT | Performed by: INTERNAL MEDICINE

## 2021-01-06 PROCEDURE — 83516 IMMUNOASSAY NONANTIBODY: CPT | Performed by: INTERNAL MEDICINE

## 2021-01-06 PROCEDURE — 80048 BASIC METABOLIC PNL TOTAL CA: CPT | Performed by: INTERNAL MEDICINE

## 2021-01-06 PROCEDURE — 81001 URINALYSIS AUTO W/SCOPE: CPT | Performed by: INTERNAL MEDICINE

## 2021-01-06 PROCEDURE — 86235 NUCLEAR ANTIGEN ANTIBODY: CPT | Performed by: INTERNAL MEDICINE

## 2021-01-06 PROCEDURE — 84100 ASSAY OF PHOSPHORUS: CPT | Performed by: INTERNAL MEDICINE

## 2021-01-06 PROCEDURE — 83735 ASSAY OF MAGNESIUM: CPT | Performed by: INTERNAL MEDICINE

## 2021-01-06 PROCEDURE — 84550 ASSAY OF BLOOD/URIC ACID: CPT | Performed by: INTERNAL MEDICINE

## 2021-01-07 ENCOUNTER — TELEPHONE (OUTPATIENT)
Dept: NEPHROLOGY | Facility: CLINIC | Age: 26
End: 2021-01-07

## 2021-01-07 DIAGNOSIS — N04.9 NEPHROTIC SYNDROME: Primary | ICD-10-CM

## 2021-01-08 VITALS — BODY MASS INDEX: 36.88 KG/M2 | HEIGHT: 67 IN | WEIGHT: 235 LBS

## 2021-01-08 LAB
ANA SCREEN: POSITIVE
CENTROMERE AUTOAB: <100 AU/ML (ref ?–100)
DSDNA AUTOAB: <100 IU/ML (ref ?–100)
HISTONE AUTOAB: <100 AU/ML (ref ?–100)
JO-1 AUTOAB: <100 AU/ML (ref ?–100)
MYELOPEROX ANTIBODIES, IGG: 3 AU/ML
RNP AUTOAB: <100 AU/ML (ref ?–100)
SCL-70 AUTOAB: 134 AU/ML (ref ?–100)
SERINE PROTEASE3, IGG: 7 AU/ML
SM AUTOAB (SMITH): <100 AU/ML (ref ?–100)
SSA AUTOAB: <100 AU/ML (ref ?–100)
SSB AUTOAB: <100 AU/ML (ref ?–100)

## 2021-01-08 RX ORDER — FERROUS SULFATE TAB EC 324 MG (65 MG FE EQUIVALENT) 324 (65 FE) MG
TABLET DELAYED RESPONSE ORAL DAILY
COMMUNITY

## 2021-01-08 RX ORDER — RIBOFLAVIN (VITAMIN B2) 100 MG
100 TABLET ORAL DAILY
COMMUNITY

## 2021-01-11 ENCOUNTER — LAB ENCOUNTER (OUTPATIENT)
Dept: LAB | Facility: HOSPITAL | Age: 26
End: 2021-01-11
Attending: INTERNAL MEDICINE
Payer: COMMERCIAL

## 2021-01-11 DIAGNOSIS — N04.9 NEPHROTIC SYNDROME: ICD-10-CM

## 2021-01-12 LAB — SARS-COV-2 RNA RESP QL NAA+PROBE: NOT DETECTED

## 2021-01-13 RX ORDER — SODIUM CHLORIDE 9 MG/ML
INJECTION, SOLUTION INTRAVENOUS CONTINUOUS
Status: CANCELLED | OUTPATIENT
Start: 2021-01-13

## 2021-01-13 RX ORDER — MIDAZOLAM HYDROCHLORIDE 1 MG/ML
1 INJECTION INTRAMUSCULAR; INTRAVENOUS EVERY 5 MIN PRN
Status: CANCELLED | OUTPATIENT
Start: 2021-01-13

## 2021-01-13 RX ORDER — FLUMAZENIL 0.1 MG/ML
0.2 INJECTION, SOLUTION INTRAVENOUS AS NEEDED
Status: CANCELLED | OUTPATIENT
Start: 2021-01-13

## 2021-01-13 RX ORDER — NALOXONE HYDROCHLORIDE 0.4 MG/ML
80 INJECTION, SOLUTION INTRAMUSCULAR; INTRAVENOUS; SUBCUTANEOUS AS NEEDED
Status: CANCELLED | OUTPATIENT
Start: 2021-01-13

## 2021-01-13 RX ORDER — MIDAZOLAM HYDROCHLORIDE 1 MG/ML
1 INJECTION INTRAMUSCULAR; INTRAVENOUS EVERY 5 MIN PRN
Status: CANCELLED | OUTPATIENT
Start: 2021-01-14 | End: 2021-01-14

## 2021-01-14 ENCOUNTER — NURSE ONLY (OUTPATIENT)
Dept: LAB | Facility: HOSPITAL | Age: 26
End: 2021-01-14
Attending: INTERNAL MEDICINE
Payer: COMMERCIAL

## 2021-01-14 ENCOUNTER — TELEPHONE (OUTPATIENT)
Dept: NEPHROLOGY | Facility: CLINIC | Age: 26
End: 2021-01-14

## 2021-01-14 ENCOUNTER — HOSPITAL ENCOUNTER (OUTPATIENT)
Dept: CT IMAGING | Facility: HOSPITAL | Age: 26
Discharge: HOME | End: 2021-01-14
Attending: INTERNAL MEDICINE
Payer: COMMERCIAL

## 2021-01-14 DIAGNOSIS — N05.9 NEPHRITIC SYNDROME: ICD-10-CM

## 2021-01-14 DIAGNOSIS — N05.9 NEPHRITIC SYNDROME: Primary | ICD-10-CM

## 2021-01-14 LAB
INR BLD: 0.88 (ref 0.89–1.11)
PSA SERPL DL<=0.01 NG/ML-MCNC: 12.2 SECONDS (ref 12.4–14.6)

## 2021-01-14 PROCEDURE — 85610 PROTHROMBIN TIME: CPT

## 2021-01-14 PROCEDURE — 36415 COLL VENOUS BLD VENIPUNCTURE: CPT

## 2021-01-14 RX ORDER — AMLODIPINE BESYLATE 5 MG/1
5 TABLET ORAL DAILY
Qty: 30 TABLET | Refills: 0 | Status: SHIPPED | OUTPATIENT
Start: 2021-01-14 | End: 2021-02-13

## 2021-01-15 ENCOUNTER — TELEPHONE (OUTPATIENT)
Dept: FAMILY MEDICINE CLINIC | Facility: CLINIC | Age: 26
End: 2021-01-15

## 2021-01-15 RX ORDER — NORGESTIMATE AND ETHINYL ESTRADIOL 7DAYSX3 28
KIT ORAL
Qty: 28 TABLET | Refills: 12 | OUTPATIENT
Start: 2021-01-15

## 2021-01-15 NOTE — TELEPHONE ENCOUNTER
Pt would like a med for anxiety - nephrologist recommended she take something but stated they could not prescribe that and to contact Marleni.  Please advise - informed pt she would need appt but she would like to speak to a nurse first.

## 2021-01-15 NOTE — PROGRESS NOTES
HPI:    Patient ID: Brian Melara is a 22year old female. HPI     Phone visit done with patient in regards to her anxiety. See a phone note.   Patient was supposed to have her renal biopsy done yesterday, but they were unable to get her blood press valACYclovir HCl 1 G Oral Tab Take 1 tablet (1,000 mg total) by mouth daily. 90 tablet 0   • Norgestim-Eth Estrad Triphasic (TRI FEMYNOR) 0.18/0.215/0.25 MG-35 MCG Oral Tab Take 1 tablet by mouth once daily.  84 tablet 0     Allergies:No Known Allergies   P [id number]

## 2021-01-15 NOTE — TELEPHONE ENCOUNTER
Pt called about her BP issue  Kidney bx delayed because she was hypertensive  Asked pt to get BP machine  Start on amlodipine 5  Keep log of BP bid  Goal bp <130/90  Continue propranolol  F/u in clinc in 1 wk

## 2021-01-15 NOTE — TELEPHONE ENCOUNTER
Informed patient that in December Marleni had given her a 3 month supply of her BC to [facility]. Patient states she picked up the prescription but they only gave her 1 package.   Patient will contact the pharmacy to see where the mistake was and have the pharma

## 2021-01-15 NOTE — TELEPHONE ENCOUNTER
Patient states Abbi Schultz had recently referred her to Nephrologist.  States she went in for a Kidney biopsy yesterday and her Bp had \"marisa rocketed\" so they had to cancel her appt. States she was there about 3 hrs and had her bp taken every 10 mins.   Patient

## 2021-01-15 NOTE — PATIENT INSTRUCTIONS
Use Xanax sparingly. Recommend to take before her next procedure. Do not drive on the medication until you know how it is going to affect you. Start the amlodipine today. Follow-up as needed.

## 2021-01-15 NOTE — TELEPHONE ENCOUNTER
Patient would like a call back in regards to her birth control, states that it's being denied and does not understand why.

## 2021-01-27 RX ORDER — PROPRANOLOL HCL 60 MG
60 CAPSULE, EXTENDED RELEASE 24HR ORAL DAILY
Qty: 30 CAPSULE | Refills: 0 | Status: SHIPPED | OUTPATIENT
Start: 2021-01-27 | End: 2021-02-26

## 2021-01-27 NOTE — TELEPHONE ENCOUNTER
Future appt:    Last Appointment with provider:   12/16/2020 Well adult, return in 1 year 12/16/21  Last appointment at [facility]:  12/16/2020  Cholesterol, Total (mg/dL)   Date Value   12/16/2020 284 (H)     HDL Cholesterol (mg/dL)   Date Value   12/16

## 2021-01-28 ENCOUNTER — TELEPHONE (OUTPATIENT)
Dept: NEPHROLOGY | Facility: CLINIC | Age: 26
End: 2021-01-28

## 2021-01-28 DIAGNOSIS — R80.9 PROTEINURIA, UNSPECIFIED TYPE: Primary | ICD-10-CM

## 2021-01-28 NOTE — TELEPHONE ENCOUNTER
Pt states she was scheduled for a kidney bx however, she didn't have it done and states she was adv she would receive a call from Dr. Oh Damico and hasn't heard anything.  Please call pt at   Telephone Information:   Mobile [phone]

## 2021-02-06 ENCOUNTER — LAB ENCOUNTER (OUTPATIENT)
Dept: LAB | Age: 26
End: 2021-02-06
Attending: INTERNAL MEDICINE
Payer: COMMERCIAL

## 2021-02-06 DIAGNOSIS — R80.9 PROTEINURIA, UNSPECIFIED TYPE: ICD-10-CM

## 2021-02-07 LAB — SARS-COV-2 RNA RESP QL NAA+PROBE: NOT DETECTED

## 2021-02-09 ENCOUNTER — NURSE ONLY (OUTPATIENT)
Dept: LAB | Facility: HOSPITAL | Age: 26
End: 2021-02-09
Attending: INTERNAL MEDICINE
Payer: COMMERCIAL

## 2021-02-09 ENCOUNTER — HOSPITAL ENCOUNTER (OUTPATIENT)
Dept: CT IMAGING | Facility: HOSPITAL | Age: 26
Discharge: HOME | End: 2021-02-09
Attending: INTERNAL MEDICINE
Payer: COMMERCIAL

## 2021-02-09 VITALS
SYSTOLIC BLOOD PRESSURE: 124 MMHG | HEART RATE: 64 BPM | OXYGEN SATURATION: 100 % | TEMPERATURE: 98 F | WEIGHT: 235 LBS | DIASTOLIC BLOOD PRESSURE: 86 MMHG | BODY MASS INDEX: 36.88 KG/M2 | HEIGHT: 67 IN | RESPIRATION RATE: 18 BRPM

## 2021-02-09 DIAGNOSIS — R80.9 PROTEINURIA, UNSPECIFIED TYPE: ICD-10-CM

## 2021-02-09 DIAGNOSIS — R80.9 PROTEINURIA: Primary | ICD-10-CM

## 2021-02-09 DIAGNOSIS — R80.9 PROTEINURIA: ICD-10-CM

## 2021-02-09 LAB
DEPRECATED RDW RBC AUTO: 48.4 FL (ref 35.1–46.3)
ERYTHROCYTE [DISTWIDTH] IN BLOOD BY AUTOMATED COUNT: 13.7 % (ref 11–15)
HCT VFR BLD AUTO: 40.1 %
HGB BLD-MCNC: 13 G/DL
INR BLD: 0.91 (ref 0.89–1.11)
MCH RBC QN AUTO: 31.4 PG (ref 26–34)
MCHC RBC AUTO-ENTMCNC: 32.4 G/DL (ref 31–37)
MCV RBC AUTO: 96.9 FL
PLATELET # BLD AUTO: 335 10(3)UL (ref 150–450)
PSA SERPL DL<=0.01 NG/ML-MCNC: 12.5 SECONDS (ref 12.4–14.6)
RBC # BLD AUTO: 4.14 X10(6)UL
WBC # BLD AUTO: 8.8 X10(3) UL (ref 4–11)

## 2021-02-09 PROCEDURE — 88348 ELECTRON MICROSCOPY DX: CPT | Performed by: INTERNAL MEDICINE

## 2021-02-09 PROCEDURE — 36415 COLL VENOUS BLD VENIPUNCTURE: CPT

## 2021-02-09 PROCEDURE — 88350 IMFLUOR EA ADDL 1ANTB STN PX: CPT | Performed by: INTERNAL MEDICINE

## 2021-02-09 PROCEDURE — 99152 MOD SED SAME PHYS/QHP 5/>YRS: CPT | Performed by: INTERNAL MEDICINE

## 2021-02-09 PROCEDURE — 50200 RENAL BIOPSY PERQ: CPT | Performed by: INTERNAL MEDICINE

## 2021-02-09 PROCEDURE — 88305 TISSUE EXAM BY PATHOLOGIST: CPT | Performed by: INTERNAL MEDICINE

## 2021-02-09 PROCEDURE — 88346 IMFLUOR 1ST 1ANTB STAIN PX: CPT | Performed by: INTERNAL MEDICINE

## 2021-02-09 PROCEDURE — 85027 COMPLETE CBC AUTOMATED: CPT

## 2021-02-09 PROCEDURE — 77012 CT SCAN FOR NEEDLE BIOPSY: CPT | Performed by: INTERNAL MEDICINE

## 2021-02-09 PROCEDURE — 85610 PROTHROMBIN TIME: CPT

## 2021-02-09 PROCEDURE — 88313 SPECIAL STAINS GROUP 2: CPT | Performed by: INTERNAL MEDICINE

## 2021-02-09 RX ORDER — MIDAZOLAM HYDROCHLORIDE 1 MG/ML
1 INJECTION INTRAMUSCULAR; INTRAVENOUS EVERY 5 MIN PRN
Status: ACTIVE | OUTPATIENT
Start: 2021-02-09 | End: 2021-02-09

## 2021-02-09 RX ORDER — ACETAMINOPHEN 325 MG/1
650 TABLET ORAL EVERY 6 HOURS PRN
Status: DISCONTINUED | OUTPATIENT
Start: 2021-02-09 | End: 2021-02-11

## 2021-02-09 RX ORDER — HYDRALAZINE HYDROCHLORIDE 20 MG/ML
INJECTION INTRAMUSCULAR; INTRAVENOUS
Status: DISCONTINUED
Start: 2021-02-09 | End: 2021-02-09 | Stop reason: WASHOUT

## 2021-02-09 RX ORDER — SODIUM CHLORIDE 9 MG/ML
INJECTION, SOLUTION INTRAVENOUS CONTINUOUS
Status: DISCONTINUED | OUTPATIENT
Start: 2021-02-09 | End: 2021-02-11

## 2021-02-09 RX ORDER — MIDAZOLAM HYDROCHLORIDE 1 MG/ML
INJECTION INTRAMUSCULAR; INTRAVENOUS
Status: COMPLETED
Start: 2021-02-09 | End: 2021-02-09

## 2021-02-09 RX ORDER — FLUMAZENIL 0.1 MG/ML
0.2 INJECTION, SOLUTION INTRAVENOUS AS NEEDED
Status: DISCONTINUED | OUTPATIENT
Start: 2021-02-09 | End: 2021-02-11

## 2021-02-09 RX ORDER — HYDROCODONE BITARTRATE AND ACETAMINOPHEN 5; 325 MG/1; MG/1
2 TABLET ORAL EVERY 4 HOURS PRN
Status: DISCONTINUED | OUTPATIENT
Start: 2021-02-09 | End: 2021-02-11

## 2021-02-09 RX ORDER — HYDROCODONE BITARTRATE AND ACETAMINOPHEN 5; 325 MG/1; MG/1
1 TABLET ORAL EVERY 4 HOURS PRN
Status: DISCONTINUED | OUTPATIENT
Start: 2021-02-09 | End: 2021-02-11

## 2021-02-09 RX ORDER — NALOXONE HYDROCHLORIDE 0.4 MG/ML
80 INJECTION, SOLUTION INTRAMUSCULAR; INTRAVENOUS; SUBCUTANEOUS AS NEEDED
Status: DISCONTINUED | OUTPATIENT
Start: 2021-02-09 | End: 2021-02-11

## 2021-02-09 RX ADMIN — MIDAZOLAM HYDROCHLORIDE 1 MG: 1 INJECTION INTRAMUSCULAR; INTRAVENOUS at 09:19:00

## 2021-02-09 RX ADMIN — SODIUM CHLORIDE: 9 INJECTION, SOLUTION INTRAVENOUS at 09:09:00

## 2021-02-09 RX ADMIN — MIDAZOLAM HYDROCHLORIDE 1 MG: 1 INJECTION INTRAMUSCULAR; INTRAVENOUS at 09:25:00

## 2021-02-09 NOTE — IMAGING NOTE
Kala Flor, name, date of birth and allergies verified with pt. Pt here for kidney biopsy. States NPO since 1900 last evening. Pre procedure vitals checked. IV access established to L lateral AC. saline lock. 0.9 NS IV initiated to maintain patency.

## 2021-02-09 NOTE — PROCEDURES
[facility]  Procedure Note    Jame Ba Patient Status:  Outpatient    3/22/1995 MRN [id number]   [facility] Attending Anthony Jones MD   Hosp Day # 0 PCP Denis Moyer MD     Procedure: left kidney biospy    Pre-Proce

## 2021-02-11 ENCOUNTER — TELEPHONE (OUTPATIENT)
Dept: NEPHROLOGY | Facility: CLINIC | Age: 26
End: 2021-02-11

## 2021-02-11 NOTE — TELEPHONE ENCOUNTER
Your Appointments    Tuesday February 23, 2021  3:40 PM  Exam - Established with Jessica Blanchard MD  [address] [facility] [address] [facility]) [address]  [place] [phone]

## 2021-02-11 NOTE — TELEPHONE ENCOUNTER
Pt called about kidney biopsy results  Will plan to have her see me in about 1 wk  Will need to discuss possible use of steroids.   Her biopsy unfortunately shows mostly chronic findings  siffuse global and segmental Glomerulosclerosis  Severe interstial fi

## 2021-02-12 ENCOUNTER — OFFICE VISIT (OUTPATIENT)
Dept: NEPHROLOGY | Facility: CLINIC | Age: 26
End: 2021-02-12
Payer: COMMERCIAL

## 2021-02-12 VITALS — BODY MASS INDEX: 37 KG/M2 | SYSTOLIC BLOOD PRESSURE: 122 MMHG | WEIGHT: 237.81 LBS | DIASTOLIC BLOOD PRESSURE: 84 MMHG

## 2021-02-12 DIAGNOSIS — R76.8 ANA POSITIVE: ICD-10-CM

## 2021-02-12 DIAGNOSIS — N18.4 STAGE 4 CHRONIC KIDNEY DISEASE (HCC): ICD-10-CM

## 2021-02-12 DIAGNOSIS — R80.9 PROTEINURIA, UNSPECIFIED TYPE: Primary | ICD-10-CM

## 2021-02-12 PROCEDURE — 3074F SYST BP LT 130 MM HG: CPT | Performed by: INTERNAL MEDICINE

## 2021-02-12 PROCEDURE — 99215 OFFICE O/P EST HI 40 MIN: CPT | Performed by: INTERNAL MEDICINE

## 2021-02-12 PROCEDURE — 3079F DIAST BP 80-89 MM HG: CPT | Performed by: INTERNAL MEDICINE

## 2021-02-12 RX ORDER — LISINOPRIL 20 MG/1
20 TABLET ORAL DAILY
Qty: 30 TABLET | Refills: 1 | Status: SHIPPED | OUTPATIENT
Start: 2021-02-12 | End: 2021-03-14

## 2021-02-12 NOTE — PROGRESS NOTES
Nephrology Consult Note      REASON FOR CONSULT:  Abnormal Renal US; Elevated Cr; proteinuria         HPI:   Aline Carballo is a 22year old female with Patient presents with:  Proteinuria    José Miguel Hassan MD    23 y/o female presents for follow up Cap Take 1 capsule (50,000 Units total) by mouth once a week for 12 doses. 12 capsule 0   • valACYclovir HCl 1 G Oral Tab Take 1 tablet (1,000 mg total) by mouth daily.  90 tablet 0   • Norgestim-Eth Estrad Triphasic (TRI FEMYNOR) 0.18/0.215/0.25 MG-35 MCG rhythm, S1, S2 normal, no murmur or rub  Lungs: Clear without wheezes, rales, rhonchi. Abdomen: Soft, non-tender. + bowel sounds, no palpable organomegaly; no bruits  Extremities: Without clubbing, cyanosis or edema.   Neurologic:  normal affect, cranial No sig nsaid use.     - focus on BP management   - check A1C; hepatitis serology/HIV  - start on lisinopril - reviewed risk of medication side effects/teratogenic risk   - monitor labs; - plan to repeat BMP in 2 wks  - will refer to rheumatology  - discusse

## 2021-02-15 RX ORDER — AMLODIPINE BESYLATE 5 MG/1
TABLET ORAL
Refills: 0 | OUTPATIENT
Start: 2021-02-15

## 2021-02-15 RX ORDER — AMLODIPINE BESYLATE 5 MG/1
5 TABLET ORAL DAILY
Qty: 90 TABLET | Refills: 1 | Status: SHIPPED | OUTPATIENT
Start: 2021-02-15 | End: 2021-08-17

## 2021-02-16 ENCOUNTER — OFFICE VISIT (OUTPATIENT)
Dept: RHEUMATOLOGY | Facility: CLINIC | Age: 26
End: 2021-02-16
Payer: COMMERCIAL

## 2021-02-16 ENCOUNTER — LAB ENCOUNTER (OUTPATIENT)
Dept: LAB | Age: 26
End: 2021-02-16
Attending: INTERNAL MEDICINE
Payer: COMMERCIAL

## 2021-02-16 VITALS
RESPIRATION RATE: 16 BRPM | WEIGHT: 240 LBS | TEMPERATURE: 98 F | HEIGHT: 67 IN | DIASTOLIC BLOOD PRESSURE: 92 MMHG | HEART RATE: 84 BPM | BODY MASS INDEX: 37.67 KG/M2 | SYSTOLIC BLOOD PRESSURE: 148 MMHG

## 2021-02-16 DIAGNOSIS — R76.8 POSITIVE ANA (ANTINUCLEAR ANTIBODY): ICD-10-CM

## 2021-02-16 DIAGNOSIS — R76.8 ANA POSITIVE: Primary | ICD-10-CM

## 2021-02-16 DIAGNOSIS — R76.8 SCL-70 ANTIBODY POSITIVE: Primary | ICD-10-CM

## 2021-02-16 DIAGNOSIS — R76.8 SCL-70 ANTIBODY POSITIVE: ICD-10-CM

## 2021-02-16 DIAGNOSIS — N26.9 GLOMERULOSCLEROSIS: ICD-10-CM

## 2021-02-16 PROCEDURE — 3008F BODY MASS INDEX DOCD: CPT | Performed by: INTERNAL MEDICINE

## 2021-02-16 PROCEDURE — 3077F SYST BP >= 140 MM HG: CPT | Performed by: INTERNAL MEDICINE

## 2021-02-16 PROCEDURE — 86235 NUCLEAR ANTIGEN ANTIBODY: CPT

## 2021-02-16 PROCEDURE — 36415 COLL VENOUS BLD VENIPUNCTURE: CPT

## 2021-02-16 PROCEDURE — 83516 IMMUNOASSAY NONANTIBODY: CPT

## 2021-02-16 PROCEDURE — 3080F DIAST BP >= 90 MM HG: CPT | Performed by: INTERNAL MEDICINE

## 2021-02-16 PROCEDURE — 99245 OFF/OP CONSLTJ NEW/EST HI 55: CPT | Performed by: INTERNAL MEDICINE

## 2021-02-16 NOTE — PROGRESS NOTES
RHEUMATOLOGY NEW PATIENT   Date of visit: 2/16/2021  ? Patient presents with:  Establish Care: New pt. Dr. Ruth Rao referral. Stage 4 kidney failure. Biopsy done. Bloodwork showed sclerderma antibodies.       ASSESSMENT, DISCUSSION & PLAN   Assessment:  Sc (60-74min) on date of service in preparing to see the patient, obtaining and/or reviewing separately obtained history, performing a medically appropriate examination, counseling and educating the patient/family/caregiver, ordering medications or testing, r seemed to triggered her anxiety. Was given propranolol for the anxiety and elevated blood pressure. Is in the process of potentially weaning. Had been having blood pressure issues until fall of 2020 at a health screen.  Then had amlodipine started in [facility] V sinus infections/disease or epistaxis. Denies chronic cough or hemoptysis. Denies obvious blood in urine. Family hx:  Denies any known family hx of autoimmune disease.    Father with hx of DVT and pelvic area, workup was negative for genetic/autoimmu tablet, Rfl: 0    •  Norgestim-Eth Estrad Triphasic (TRI FEMYNOR) 0.18/0.215/0.25 MG-35 MCG Oral Tab, Take 1 tablet by mouth once daily. , Disp: 84 tablet, Rfl: 0      Modified Medications    No medications on file     There are no discontinued medications. JVD present. No tracheal deviation present. Cardiovascular: Normal rate, regular rhythm, normal heart sounds and intact distal pulses. No murmur heard. Pulmonary/Chest: Effort normal and breath sounds normal. No respiratory distress.  She has no wheeze 02/09/2021    NEPRELIM 5.23 12/16/2020    NEPERCENT 67.9 12/16/2020    LYPERCENT 20.6 12/16/2020    MOPERCENT 6.3 12/16/2020    EOPERCENT 4.6 12/16/2020    BAPERCENT 0.3 12/16/2020    NE 5.23 12/16/2020    LYMABS 1.58 12/16/2020    MOABSO 0.48 12/16/2020

## 2021-02-18 ENCOUNTER — TELEPHONE (OUTPATIENT)
Dept: FAMILY MEDICINE CLINIC | Facility: CLINIC | Age: 26
End: 2021-02-18

## 2021-02-18 LAB — RNA POLYMERASE III ANTIBODY IGG: 6 UNITS

## 2021-02-18 NOTE — TELEPHONE ENCOUNTER
Called patient after reading the rheumatology report and remembering that she is a twin. Encouraged her to have her sister monitor for symptoms and make sure that her renal function is checked.  [place]

## 2021-02-19 LAB — SCL-70 AUTOAB: <100 AU/ML (ref ?–100)

## 2021-02-22 ENCOUNTER — TELEPHONE (OUTPATIENT)
Dept: FAMILY MEDICINE CLINIC | Facility: CLINIC | Age: 26
End: 2021-02-22

## 2021-02-22 DIAGNOSIS — K21.00 GASTROESOPHAGEAL REFLUX DISEASE WITH ESOPHAGITIS WITHOUT HEMORRHAGE: Primary | ICD-10-CM

## 2021-02-22 NOTE — TELEPHONE ENCOUNTER
pt is requesting a different GI Dr- Dr Brian Lopez is not doing any in patient assessments and she needs a dr that will see her in person

## 2021-02-23 NOTE — TELEPHONE ENCOUNTER
Spoke with patient and she is willing to drive to [place] to see an in person GI. Referral done to Dr. Saul Claros. Patient given address and phone number.

## 2021-02-24 ENCOUNTER — LAB ENCOUNTER (OUTPATIENT)
Dept: LAB | Facility: HOSPITAL | Age: 26
End: 2021-02-24
Attending: INTERNAL MEDICINE
Payer: COMMERCIAL

## 2021-02-24 DIAGNOSIS — D89.89 RADIATION CHIMERA (HCC): Primary | ICD-10-CM

## 2021-02-24 PROCEDURE — 36415 COLL VENOUS BLD VENIPUNCTURE: CPT

## 2021-03-02 RX ORDER — PROPRANOLOL HCL 60 MG
60 CAPSULE, EXTENDED RELEASE 24HR ORAL DAILY
Qty: 90 CAPSULE | Refills: 1 | Status: SHIPPED | OUTPATIENT
Start: 2021-03-02 | End: 2021-04-01

## 2021-03-03 ENCOUNTER — TELEPHONE (OUTPATIENT)
Dept: NEPHROLOGY | Facility: CLINIC | Age: 26
End: 2021-03-03

## 2021-03-03 ENCOUNTER — LAB ENCOUNTER (OUTPATIENT)
Dept: LAB | Facility: HOSPITAL | Age: 26
End: 2021-03-03
Attending: INTERNAL MEDICINE
Payer: COMMERCIAL

## 2021-03-03 DIAGNOSIS — N18.4 STAGE 4 CHRONIC KIDNEY DISEASE (HCC): ICD-10-CM

## 2021-03-03 DIAGNOSIS — R19.7 DIARRHEA, UNSPECIFIED TYPE: ICD-10-CM

## 2021-03-03 DIAGNOSIS — N04.9 NEPHROTIC SYNDROME: ICD-10-CM

## 2021-03-03 DIAGNOSIS — R80.9 PROTEINURIA, UNSPECIFIED TYPE: ICD-10-CM

## 2021-03-03 LAB
ANION GAP SERPL CALC-SCNC: 6 MMOL/L (ref 0–18)
BUN BLD-MCNC: 36 MG/DL (ref 7–18)
BUN/CREAT SERPL: 14.3 (ref 10–20)
CALCIUM BLD-MCNC: 9.4 MG/DL (ref 8.5–10.1)
CHLORIDE SERPL-SCNC: 112 MMOL/L (ref 98–112)
CO2 SERPL-SCNC: 20 MMOL/L (ref 21–32)
CREAT BLD-MCNC: 2.52 MG/DL
CREAT UR-SCNC: 70.1 MG/DL
EST. AVERAGE GLUCOSE BLD GHB EST-MCNC: 117 MG/DL (ref 68–126)
GLUCOSE BLD-MCNC: 110 MG/DL (ref 70–99)
HAV IGM SER QL: 2.1 MG/DL (ref 1.6–2.6)
HBA1C MFR BLD HPLC: 5.7 % (ref ?–5.7)
HBV SURFACE AG SER-ACNC: 0.19 [IU]/L
HBV SURFACE AG SERPL QL IA: NONREACTIVE
HCV AB SERPL QL IA: NONREACTIVE
IGA SERPL-MCNC: 193 MG/DL (ref 70–312)
INR BLD: 0.97 (ref 0.89–1.11)
OSMOLALITY SERPL CALC.SUM OF ELEC: 295 MOSM/KG (ref 275–295)
PATIENT FASTING Y/N/NP: YES
POTASSIUM SERPL-SCNC: 4.4 MMOL/L (ref 3.5–5.1)
PROT UR-MCNC: 263.4 MG/DL
PSA SERPL DL<=0.01 NG/ML-MCNC: 13.2 SECONDS (ref 12.4–14.6)
SODIUM SERPL-SCNC: 138 MMOL/L (ref 136–145)

## 2021-03-03 PROCEDURE — 84156 ASSAY OF PROTEIN URINE: CPT

## 2021-03-03 PROCEDURE — 85610 PROTHROMBIN TIME: CPT

## 2021-03-03 PROCEDURE — 87389 HIV-1 AG W/HIV-1&-2 AB AG IA: CPT

## 2021-03-03 PROCEDURE — 36415 COLL VENOUS BLD VENIPUNCTURE: CPT

## 2021-03-03 PROCEDURE — 83036 HEMOGLOBIN GLYCOSYLATED A1C: CPT

## 2021-03-03 PROCEDURE — 83516 IMMUNOASSAY NONANTIBODY: CPT

## 2021-03-03 PROCEDURE — 82784 ASSAY IGA/IGD/IGG/IGM EACH: CPT

## 2021-03-03 PROCEDURE — 87340 HEPATITIS B SURFACE AG IA: CPT

## 2021-03-03 PROCEDURE — 83735 ASSAY OF MAGNESIUM: CPT

## 2021-03-03 PROCEDURE — 86803 HEPATITIS C AB TEST: CPT

## 2021-03-03 PROCEDURE — 82570 ASSAY OF URINE CREATININE: CPT

## 2021-03-03 PROCEDURE — 80048 BASIC METABOLIC PNL TOTAL CA: CPT

## 2021-03-04 ENCOUNTER — LABORATORY ENCOUNTER (OUTPATIENT)
Dept: LAB | Age: 26
End: 2021-03-04
Attending: FAMILY MEDICINE
Payer: COMMERCIAL

## 2021-03-04 ENCOUNTER — TELEPHONE (OUTPATIENT)
Dept: NEPHROLOGY | Facility: CLINIC | Age: 26
End: 2021-03-04

## 2021-03-04 DIAGNOSIS — R19.7 DIARRHEA, UNSPECIFIED TYPE: ICD-10-CM

## 2021-03-04 LAB
CRYPTOSP AG STL QL IA: NEGATIVE
G LAMBLIA AG STL QL IA: NEGATIVE

## 2021-03-04 PROCEDURE — 87272 CRYPTOSPORIDIUM AG IF: CPT

## 2021-03-04 PROCEDURE — 82656 EL-1 FECAL QUAL/SEMIQ: CPT

## 2021-03-04 PROCEDURE — 87427 SHIGA-LIKE TOXIN AG IA: CPT

## 2021-03-04 PROCEDURE — 87045 FECES CULTURE AEROBIC BACT: CPT

## 2021-03-04 PROCEDURE — 87329 GIARDIA AG IA: CPT

## 2021-03-04 PROCEDURE — 87046 STOOL CULTR AEROBIC BACT EA: CPT

## 2021-03-04 PROCEDURE — 83993 ASSAY FOR CALPROTECTIN FECAL: CPT

## 2021-03-04 PROCEDURE — 87493 C DIFF AMPLIFIED PROBE: CPT

## 2021-03-05 LAB
C DIFF TOX B STL QL: NEGATIVE
TTG IGA SER-ACNC: 6.6 U/ML (ref ?–7)

## 2021-03-07 LAB
CALPROTECTIN STL-MCNT: 10.9 ΜG/G (ref ?–50)
PANCREATIC ELASTASE , FECAL: 381 UG/G

## 2021-03-15 RX ORDER — NORGESTIMATE AND ETHINYL ESTRADIOL 7DAYSX3 28
KIT ORAL
Qty: 28 TABLET | Refills: 0 | OUTPATIENT
Start: 2021-03-15

## 2021-03-15 RX ORDER — NORGESTIMATE AND ETHINYL ESTRADIOL 7DAYSX3 28
1 KIT ORAL
Qty: 84 TABLET | Refills: 0 | OUTPATIENT
Start: 2021-03-15

## 2021-03-15 RX ORDER — VALACYCLOVIR HYDROCHLORIDE 1 G/1
1 TABLET, FILM COATED ORAL DAILY
Qty: 90 TABLET | Refills: 1 | Status: SHIPPED | OUTPATIENT
Start: 2021-03-15 | End: 2021-12-07

## 2021-03-15 RX ORDER — NORGESTIMATE AND ETHINYL ESTRADIOL 7DAYSX3 28
1 KIT ORAL
Qty: 84 TABLET | Refills: 2 | Status: SHIPPED | OUTPATIENT
Start: 2021-03-15 | End: 2021-06-01

## 2021-03-15 NOTE — TELEPHONE ENCOUNTER
Future appt:     Your appointments     Date & Time Appointment Department [facility])    Apr 14, 2021  9:00 AM CDT Follow-Up OV with Silvina Castellon, [facility] ([facility])    Please arrive 15 minutes prior to your scheduled shoaib

## 2021-03-16 RX ORDER — LISINOPRIL 20 MG/1
20 TABLET ORAL DAILY
Qty: 90 TABLET | Refills: 1 | Status: SHIPPED | OUTPATIENT
Start: 2021-03-16 | End: 2021-04-15

## 2021-03-19 ENCOUNTER — TELEPHONE (OUTPATIENT)
Dept: RHEUMATOLOGY | Facility: CLINIC | Age: 26
End: 2021-03-19

## 2021-03-19 DIAGNOSIS — R76.0 ANTI-CARDIOLIPIN ANTIBODY POSITIVE: Primary | ICD-10-CM

## 2021-03-19 NOTE — TELEPHONE ENCOUNTER
Called pt and discussed test results in detail. When she first came to see me, she had borderline Scl70 positivity. Repeat testing locally was negative. On AVISE testing, EVERTON equivocal, SCL 70 negative, but has borderline cardiolipin IgG positivity.  Marnie Ennis

## 2021-03-29 ENCOUNTER — TELEPHONE (OUTPATIENT)
Dept: NEPHROLOGY | Facility: CLINIC | Age: 26
End: 2021-03-29

## 2021-03-29 NOTE — TELEPHONE ENCOUNTER
Pt calling stating she hasn't heard anything from GI or Rheumatology so she is reaching out to see if there are any next steps to her treatment plan or anything she is to be doing at this time?      Pt aware Dr. Dayanara Jasmine out of the office and is willing to wa

## 2021-04-06 ENCOUNTER — TELEPHONE (OUTPATIENT)
Dept: NEPHROLOGY | Facility: CLINIC | Age: 26
End: 2021-04-06

## 2021-04-06 DIAGNOSIS — N18.4 STAGE 4 CHRONIC KIDNEY DISEASE (HCC): Primary | ICD-10-CM

## 2021-04-06 NOTE — TELEPHONE ENCOUNTER
She was wondering if you will refill her Xanax?     Future Appointments   Date Time Provider Eunice Graves   4/14/2021  9:00 AM JOHNSON Hubbard [facility]

## 2021-04-06 NOTE — TELEPHONE ENCOUNTER
Future appt:     Your appointments     Date & Time Appointment Department [facility])    Apr 14, 2021  9:00 AM CDT Follow-Up OV with [facility] ([facility])    Please arrive 15 minutes prior to your scheduled shoaib

## 2021-04-07 ENCOUNTER — TELEPHONE (OUTPATIENT)
Dept: FAMILY MEDICINE CLINIC | Facility: CLINIC | Age: 26
End: 2021-04-07

## 2021-04-07 NOTE — TELEPHONE ENCOUNTER
Returned call to patient. Patient states she is concerned that her xanax refill was just sent to the pharmacy without Marleni calling patient to discuss how she was doing with her anxiety.      Patient states that at her last appointment with Jolynn dooley she is in debt 20 thousand dollars from all of that treatment, and how she isn't able to easily afford the $80 copay.      Informed patient that I empathize with her, and that everyone has things that arise, etc.     Reiterated need for appointment and offe

## 2021-04-08 NOTE — TELEPHONE ENCOUNTER
Spoke with patient. Patient is concerned about taking the Xanax appropriately. Discussed about taking. She is requesting just a couple more so that she does not feel anxious about taking more than what she needs.   Discussed I was not concerned about her

## 2021-04-15 ENCOUNTER — TELEPHONE (OUTPATIENT)
Dept: FAMILY MEDICINE CLINIC | Facility: CLINIC | Age: 26
End: 2021-04-15

## 2021-04-15 DIAGNOSIS — F41.0 EPISODIC PAROXYSMAL ANXIETY DISORDER: ICD-10-CM

## 2021-04-15 RX ORDER — ALPRAZOLAM 0.5 MG/1
0.5 TABLET ORAL 3 TIMES DAILY PRN
Qty: 30 TABLET | Refills: 0 | Status: SHIPPED | OUTPATIENT
Start: 2021-04-15 | End: 2021-06-02

## 2021-04-15 NOTE — TELEPHONE ENCOUNTER
Pt states refill of xanax was supposed to be changed from 20 to 30 pills but states it has not been updated at [facility] in [place] yet.

## 2021-04-19 ENCOUNTER — LAB ENCOUNTER (OUTPATIENT)
Dept: LAB | Facility: HOSPITAL | Age: 26
End: 2021-04-19
Attending: INTERNAL MEDICINE
Payer: COMMERCIAL

## 2021-04-19 DIAGNOSIS — Z01.818 PRE-OP TESTING: ICD-10-CM

## 2021-04-21 PROBLEM — R12 CHRONIC HEARTBURN: Status: ACTIVE | Noted: 2021-04-21

## 2021-04-21 PROBLEM — R19.4 CHANGE IN BOWEL HABITS: Status: ACTIVE | Noted: 2021-04-21

## 2021-04-21 PROBLEM — R10.13 ABDOMINAL PAIN, EPIGASTRIC: Status: ACTIVE | Noted: 2021-04-21

## 2021-04-21 PROBLEM — K63.5 COLON POLYP: Status: ACTIVE | Noted: 2021-04-21

## 2021-04-21 PROBLEM — R10.84 ABDOMINAL PAIN, GENERALIZED: Status: ACTIVE | Noted: 2021-04-21

## 2021-04-21 PROBLEM — K29.60 EROSIVE GASTRITIS: Status: ACTIVE | Noted: 2021-04-21

## 2021-05-21 ENCOUNTER — TELEPHONE (OUTPATIENT)
Dept: FAMILY MEDICINE CLINIC | Facility: CLINIC | Age: 26
End: 2021-05-21

## 2021-05-21 NOTE — TELEPHONE ENCOUNTER
cannot take Ibfrofein for back pain due to kidney issues;   went to chirporator     wondering about anti inflammatory           please advise      Future Appointments   Date Time Provider Eunice Graves   10/21/2021  7:00 AM Maricruz Carbajal MD [facility] E

## 2021-05-21 NOTE — TELEPHONE ENCOUNTER
Recommend to take acetaminophen up to 3000 mg per day. If needs something more, needs appointment. Please let patient know.

## 2021-05-21 NOTE — TELEPHONE ENCOUNTER
Pt states she hurt her back yesterday in the shower. Pt states she is in kidney failure, pt states she knows she cannot take Ibuprofen, pt asked if she can take any anti-inflammatory? Pt informed that all of the NSAID's can impact the kidneys.     Pt sees

## 2021-05-25 ENCOUNTER — TELEPHONE (OUTPATIENT)
Dept: NEPHROLOGY | Facility: CLINIC | Age: 26
End: 2021-05-25

## 2021-06-01 RX ORDER — NORGESTIMATE AND ETHINYL ESTRADIOL 7DAYSX3 28
KIT ORAL
Qty: 84 TABLET | Refills: 2 | Status: SHIPPED | OUTPATIENT
Start: 2021-06-01 | End: 2021-08-23

## 2021-06-02 DIAGNOSIS — F41.0 EPISODIC PAROXYSMAL ANXIETY DISORDER: ICD-10-CM

## 2021-06-02 RX ORDER — ALPRAZOLAM 0.5 MG/1
0.5 TABLET ORAL 3 TIMES DAILY PRN
Qty: 30 TABLET | Refills: 0 | Status: SHIPPED | OUTPATIENT
Start: 2021-06-02 | End: 2021-07-11

## 2021-06-02 NOTE — TELEPHONE ENCOUNTER
Alprazolam:  4/15/21     Return in 1 year (on 12/16/2021). Future appt:     Your appointments     Date & Time Appointment Department [facility])    Oct 21, 2021  7:00 AM CDT Colonoscopy with Mcihael Jimenez MD [facility])    Please

## 2021-06-09 ENCOUNTER — LABORATORY ENCOUNTER (OUTPATIENT)
Dept: LAB | Age: 26
End: 2021-06-09
Attending: INTERNAL MEDICINE
Payer: COMMERCIAL

## 2021-06-09 DIAGNOSIS — N18.4 STAGE 4 CHRONIC KIDNEY DISEASE (HCC): ICD-10-CM

## 2021-06-09 PROCEDURE — 82306 VITAMIN D 25 HYDROXY: CPT | Performed by: INTERNAL MEDICINE

## 2021-06-09 PROCEDURE — 84100 ASSAY OF PHOSPHORUS: CPT | Performed by: INTERNAL MEDICINE

## 2021-06-09 PROCEDURE — 82570 ASSAY OF URINE CREATININE: CPT | Performed by: INTERNAL MEDICINE

## 2021-06-09 PROCEDURE — 84550 ASSAY OF BLOOD/URIC ACID: CPT | Performed by: INTERNAL MEDICINE

## 2021-06-09 PROCEDURE — 83735 ASSAY OF MAGNESIUM: CPT | Performed by: INTERNAL MEDICINE

## 2021-06-09 PROCEDURE — 85025 COMPLETE CBC W/AUTO DIFF WBC: CPT | Performed by: INTERNAL MEDICINE

## 2021-06-09 PROCEDURE — 81001 URINALYSIS AUTO W/SCOPE: CPT | Performed by: INTERNAL MEDICINE

## 2021-06-09 PROCEDURE — 80048 BASIC METABOLIC PNL TOTAL CA: CPT | Performed by: INTERNAL MEDICINE

## 2021-06-09 PROCEDURE — 84156 ASSAY OF PROTEIN URINE: CPT | Performed by: INTERNAL MEDICINE

## 2021-06-16 ENCOUNTER — TELEPHONE (OUTPATIENT)
Dept: NEPHROLOGY | Facility: CLINIC | Age: 26
End: 2021-06-16

## 2021-06-16 DIAGNOSIS — N18.4 STAGE 4 CHRONIC KIDNEY DISEASE (HCC): Primary | ICD-10-CM

## 2021-07-11 DIAGNOSIS — F41.0 EPISODIC PAROXYSMAL ANXIETY DISORDER: ICD-10-CM

## 2021-07-12 RX ORDER — ALPRAZOLAM 0.5 MG/1
0.5 TABLET ORAL 3 TIMES DAILY PRN
Qty: 30 TABLET | Refills: 0 | Status: SHIPPED | OUTPATIENT
Start: 2021-07-12 | End: 2021-08-22

## 2021-07-12 NOTE — TELEPHONE ENCOUNTER
Future appt: Your appointments     Date & Time Appointment Department [facility])    Oct 21, 2021  7:00 AM CDT Colonoscopy with Jennifer Vang MD [facility])    Please arrive 60 minutes prior to your scheduled procedure time.

## 2021-08-17 ENCOUNTER — TELEPHONE (OUTPATIENT)
Dept: FAMILY MEDICINE CLINIC | Facility: CLINIC | Age: 26
End: 2021-08-17

## 2021-08-17 DIAGNOSIS — N18.4 STAGE 4 CHRONIC KIDNEY DISEASE (HCC): Primary | ICD-10-CM

## 2021-08-17 RX ORDER — AMLODIPINE BESYLATE 5 MG/1
5 TABLET ORAL DAILY
Qty: 90 TABLET | Refills: 1 | Status: SHIPPED | OUTPATIENT
Start: 2021-08-17 | End: 2021-10-11

## 2021-08-22 DIAGNOSIS — F41.0 EPISODIC PAROXYSMAL ANXIETY DISORDER: ICD-10-CM

## 2021-08-23 RX ORDER — NORGESTIMATE AND ETHINYL ESTRADIOL 7DAYSX3 28
1 KIT ORAL DAILY
Qty: 84 TABLET | Refills: 0 | Status: SHIPPED | OUTPATIENT
Start: 2021-08-23 | End: 2021-11-14

## 2021-08-23 RX ORDER — ALPRAZOLAM 0.5 MG/1
0.5 TABLET ORAL 3 TIMES DAILY PRN
Qty: 30 TABLET | Refills: 0 | Status: SHIPPED | OUTPATIENT
Start: 2021-08-23 | End: 2021-11-03

## 2021-08-23 NOTE — TELEPHONE ENCOUNTER
Future appt: Your appointments     Date & Time Appointment Department [facility])    Oct 21, 2021  7:00 AM CDT Colonoscopy with Jaun Erazo MD [facility])    Please arrive 60 minutes prior to your scheduled procedure time.

## 2021-08-23 NOTE — TELEPHONE ENCOUNTER
Future appt: Your appointments     Date & Time Appointment Department [facility])    Oct 21, 2021  7:00 AM CDT Colonoscopy with Yeny Cespedes MD [facility])    Please arrive 60 minutes prior to your scheduled procedure time.

## 2021-08-23 NOTE — TELEPHONE ENCOUNTER
One refill done. Please let patient know and that I am concerned about the increase in frequency that she is taking the xanax. If continues, needs appointment to discuss other treatment options. Thank you.

## 2021-08-24 ENCOUNTER — TELEPHONE (OUTPATIENT)
Dept: NEPHROLOGY | Facility: CLINIC | Age: 26
End: 2021-08-24

## 2021-08-24 RX ORDER — AMLODIPINE BESYLATE 5 MG/1
5 TABLET ORAL DAILY
Qty: 30 TABLET | Refills: 3 | Status: SHIPPED | OUTPATIENT
Start: 2021-08-24

## 2021-08-24 RX ORDER — PROPRANOLOL HCL 60 MG
60 CAPSULE, EXTENDED RELEASE 24HR ORAL DAILY
Qty: 30 CAPSULE | Refills: 3 | Status: SHIPPED | OUTPATIENT
Start: 2021-08-24 | End: 2021-09-23

## 2021-08-24 RX ORDER — LISINOPRIL 5 MG/1
20 TABLET ORAL DAILY
Qty: 120 TABLET | Refills: 3 | Status: SHIPPED | OUTPATIENT
Start: 2021-08-24 | End: 2021-09-23

## 2021-10-18 ENCOUNTER — LAB ENCOUNTER (OUTPATIENT)
Dept: LAB | Facility: HOSPITAL | Age: 26
End: 2021-10-18
Attending: INTERNAL MEDICINE
Payer: COMMERCIAL

## 2021-10-18 DIAGNOSIS — Z01.818 PRE-OP TESTING: ICD-10-CM

## 2021-10-21 PROBLEM — Z12.11 SPECIAL SCREENING FOR MALIGNANT NEOPLASMS, COLON: Status: ACTIVE | Noted: 2021-10-21

## 2021-10-21 PROBLEM — K64.8 INTERNAL HEMORRHOIDS: Status: ACTIVE | Noted: 2021-10-21

## 2021-10-21 PROBLEM — Z86.010 HISTORY OF ADENOMATOUS POLYP OF COLON: Status: ACTIVE | Noted: 2021-10-21

## 2021-10-21 PROBLEM — Z86.0101 HISTORY OF ADENOMATOUS POLYP OF COLON: Status: ACTIVE | Noted: 2021-10-21

## 2021-11-03 DIAGNOSIS — F41.0 EPISODIC PAROXYSMAL ANXIETY DISORDER: ICD-10-CM

## 2021-11-05 RX ORDER — ALPRAZOLAM 0.5 MG/1
0.5 TABLET ORAL 3 TIMES DAILY PRN
Qty: 30 TABLET | Refills: 0 | Status: SHIPPED | OUTPATIENT
Start: 2021-11-05 | End: 2022-01-03

## 2021-11-05 NOTE — TELEPHONE ENCOUNTER
Future appt:    Last Appointment with provider:  12/16/20 for annual physical.  Last appointment at [facility]:  Visit date not found  Cholesterol, Total (mg/dL)   Date Value   12/16/2020 284 (H)     HDL Cholesterol (mg/dL)   Date Value   12/16/2020 49

## 2021-11-09 ENCOUNTER — LAB ENCOUNTER (OUTPATIENT)
Dept: LAB | Age: 26
End: 2021-11-09
Attending: INTERNAL MEDICINE
Payer: COMMERCIAL

## 2021-11-09 DIAGNOSIS — N18.4 STAGE 4 CHRONIC KIDNEY DISEASE (HCC): ICD-10-CM

## 2021-11-09 PROCEDURE — 80048 BASIC METABOLIC PNL TOTAL CA: CPT | Performed by: INTERNAL MEDICINE

## 2021-11-09 PROCEDURE — 85025 COMPLETE CBC W/AUTO DIFF WBC: CPT | Performed by: INTERNAL MEDICINE

## 2021-11-09 PROCEDURE — 83970 ASSAY OF PARATHORMONE: CPT | Performed by: INTERNAL MEDICINE

## 2021-11-09 PROCEDURE — 82570 ASSAY OF URINE CREATININE: CPT | Performed by: INTERNAL MEDICINE

## 2021-11-09 PROCEDURE — 83735 ASSAY OF MAGNESIUM: CPT | Performed by: INTERNAL MEDICINE

## 2021-11-09 PROCEDURE — 82306 VITAMIN D 25 HYDROXY: CPT | Performed by: INTERNAL MEDICINE

## 2021-11-09 PROCEDURE — 84156 ASSAY OF PROTEIN URINE: CPT | Performed by: INTERNAL MEDICINE

## 2021-11-10 ENCOUNTER — TELEPHONE (OUTPATIENT)
Dept: NEPHROLOGY | Facility: CLINIC | Age: 26
End: 2021-11-10

## 2021-11-10 NOTE — TELEPHONE ENCOUNTER
Pt called;message left and requested call back.  Continue to monitor labs; Cr is generally stable    Given extensive glomerulosclerosis and interstitial fibrosis we have not utilized steroids/CNI/etc.

## 2021-11-14 DIAGNOSIS — E28.2 PCOS (POLYCYSTIC OVARIAN SYNDROME): Primary | ICD-10-CM

## 2021-11-15 NOTE — TELEPHONE ENCOUNTER
Pt is due for a PX  Future appt:    Last Appointment with provider:   12/16/20(PX)-F/U in 1 year  Last appointment at [facility]:  Visit date not found  Cholesterol, Total (mg/dL)   Date Value   12/16/2020 284 (H)     HDL Cholesterol (mg/dL)   Date Value

## 2021-11-16 RX ORDER — PROPRANOLOL HCL 60 MG
60 CAPSULE, EXTENDED RELEASE 24HR ORAL DAILY
Qty: 90 CAPSULE | Refills: 0 | Status: SHIPPED | OUTPATIENT
Start: 2021-11-16 | End: 2021-12-16

## 2021-11-16 RX ORDER — NORGESTIMATE AND ETHINYL ESTRADIOL 7DAYSX3 28
1 KIT ORAL DAILY
Qty: 84 TABLET | Refills: 0 | Status: SHIPPED | OUTPATIENT
Start: 2021-11-16

## 2021-11-16 NOTE — TELEPHONE ENCOUNTER
Pt called and scheduled appt for phys and pap on 12/17/21. Phys was 12/16/20 last time so she cant come in sooner. Please advise on refill until then.     Your appointments     Date & Time Appointment Department [facility])    Dec 17, 2021  8:00 AM CST Phys

## 2021-12-08 RX ORDER — VALACYCLOVIR HYDROCHLORIDE 1 G/1
1000 TABLET, FILM COATED ORAL DAILY
Qty: 90 TABLET | Refills: 1 | OUTPATIENT
Start: 2021-12-08

## 2021-12-08 RX ORDER — VALACYCLOVIR HYDROCHLORIDE 1 G/1
1000 TABLET, FILM COATED ORAL DAILY
Qty: 90 TABLET | Refills: 0 | Status: SHIPPED | OUTPATIENT
Start: 2021-12-08

## 2021-12-17 ENCOUNTER — OFFICE VISIT (OUTPATIENT)
Dept: FAMILY MEDICINE CLINIC | Facility: CLINIC | Age: 26
End: 2021-12-17
Payer: COMMERCIAL

## 2021-12-17 VITALS
RESPIRATION RATE: 18 BRPM | DIASTOLIC BLOOD PRESSURE: 96 MMHG | WEIGHT: 250 LBS | HEIGHT: 67.5 IN | TEMPERATURE: 98 F | OXYGEN SATURATION: 97 % | HEART RATE: 89 BPM | BODY MASS INDEX: 38.78 KG/M2 | SYSTOLIC BLOOD PRESSURE: 142 MMHG

## 2021-12-17 DIAGNOSIS — M72.2 PLANTAR FASCIITIS, LEFT: ICD-10-CM

## 2021-12-17 DIAGNOSIS — R06.02 SHORTNESS OF BREATH: ICD-10-CM

## 2021-12-17 DIAGNOSIS — Z13.220 LIPID SCREENING: ICD-10-CM

## 2021-12-17 DIAGNOSIS — Z00.00 WELLNESS EXAMINATION: Primary | ICD-10-CM

## 2021-12-17 DIAGNOSIS — N18.4 STAGE 4 CHRONIC KIDNEY DISEASE (HCC): ICD-10-CM

## 2021-12-17 PROBLEM — D68.61 ANTICARDIOLIPIN SYNDROME (HCC): Status: ACTIVE | Noted: 2021-12-17

## 2021-12-17 PROBLEM — Z12.11 SPECIAL SCREENING FOR MALIGNANT NEOPLASMS, COLON: Status: RESOLVED | Noted: 2021-10-21 | Resolved: 2021-12-17

## 2021-12-17 PROBLEM — K63.5 COLON POLYP: Status: RESOLVED | Noted: 2021-04-21 | Resolved: 2021-12-17

## 2021-12-17 PROBLEM — R19.4 CHANGE IN BOWEL HABITS: Status: RESOLVED | Noted: 2021-04-21 | Resolved: 2021-12-17

## 2021-12-17 PROBLEM — K29.60 EROSIVE GASTRITIS: Status: RESOLVED | Noted: 2021-04-21 | Resolved: 2021-12-17

## 2021-12-17 PROBLEM — R12 CHRONIC HEARTBURN: Status: RESOLVED | Noted: 2021-04-21 | Resolved: 2021-12-17

## 2021-12-17 PROCEDURE — 3080F DIAST BP >= 90 MM HG: CPT | Performed by: NURSE PRACTITIONER

## 2021-12-17 PROCEDURE — 3077F SYST BP >= 140 MM HG: CPT | Performed by: NURSE PRACTITIONER

## 2021-12-17 PROCEDURE — 90686 IIV4 VACC NO PRSV 0.5 ML IM: CPT | Performed by: NURSE PRACTITIONER

## 2021-12-17 PROCEDURE — 3008F BODY MASS INDEX DOCD: CPT | Performed by: NURSE PRACTITIONER

## 2021-12-17 PROCEDURE — 90471 IMMUNIZATION ADMIN: CPT | Performed by: NURSE PRACTITIONER

## 2021-12-17 PROCEDURE — 99395 PREV VISIT EST AGE 18-39: CPT | Performed by: NURSE PRACTITIONER

## 2021-12-17 RX ORDER — LISINOPRIL 5 MG/1
20 TABLET ORAL DAILY
COMMUNITY
Start: 2021-09-24 | End: 2021-12-21

## 2021-12-17 NOTE — PROGRESS NOTES
HPI:   Hank Parry is a 32year old female who presents for an Annual Health Visit. Right kidney not functioning and left was decreased functioning. Has seen GI a couple of times - has had EGD and colonoscopies.    Has seen rheumatologist an Heartburn    • High blood pressure    • High cholesterol    • Irregular bowel habits    • Kidney failure    • Loss of appetite    • Migraines    • Sleep disturbance    • Stress       Past Surgical History:   Procedure Laterality Date   • COLONOSCOPY     • normal.      Left Ear: Tympanic membrane, ear canal and external ear normal.      Nose: Nose normal.      Mouth/Throat:      Mouth: Mucous membranes are moist.      Pharynx: Posterior oropharyngeal erythema present.       Comments: Mal 4 Tonsils 0  Eyes: fascitis. If not improving, refer to physical therapy    Schedule appointment for sleep follow up. Follow up annually and as needed. Treating Plantar Fasciitis  First, your healthcare provider tries to find out the cause of your problem.  He or she as a substitute for professional medical care. Always follow your healthcare professional's instructions. The patient indicates understanding of these issues and agrees to the plan.     Problem List:  Patient Active Problem List:     Diarrhea

## 2021-12-17 NOTE — PATIENT INSTRUCTIONS
Flu vaccine today. Get cholesterol levels with next blood draw. If shortness of breath not improving or need to stay on lisinopril, get PFT. Continue current medications. Exercises for plantal fascitis.  If not improving, refer to physical t the space between the heel bone and the plantar fascia. Surinder last reviewed this educational content on 1/1/2018  © 4024-5220 [employer]. All rights reserved.  This information is not intended as a substitute for professional medical care

## 2021-12-21 ENCOUNTER — VIRTUAL PHONE E/M (OUTPATIENT)
Dept: NEPHROLOGY | Facility: CLINIC | Age: 26
End: 2021-12-21
Payer: COMMERCIAL

## 2021-12-21 DIAGNOSIS — N18.4 STAGE 4 CHRONIC KIDNEY DISEASE (HCC): Primary | ICD-10-CM

## 2021-12-21 PROCEDURE — 99213 OFFICE O/P EST LOW 20 MIN: CPT | Performed by: INTERNAL MEDICINE

## 2021-12-21 RX ORDER — LOSARTAN POTASSIUM 25 MG/1
25 TABLET ORAL DAILY
Qty: 30 TABLET | Refills: 1 | Status: SHIPPED | OUTPATIENT
Start: 2021-12-21 | End: 2022-01-20

## 2021-12-21 NOTE — PROGRESS NOTES
Nephrology Consult Note  Virtual Visit    Virtual/Telephone Check-In    Delton Castleman verbally consents to a Virtual/Telephone Check-In service on 12/21/21.   Patient has been referred to the [facility] website at [email or web address] to review the yearly Take 20 mg by mouth daily. • PROPRANOLOL HCL ER OR Take by mouth. • VITAMIN D OR Take by mouth. • valACYclovir 1 G Oral Tab Take 1 tablet (1,000 mg total) by mouth daily. 90 tablet 0   • Norgestim-Eth Estrad Triphasic (TRI-LINYAH) 0.18/0.215/0. 3.80 - 5.30 x10(6)uL 3.99   Hemoglobin      12.0 - 16.0 g/dL 12.1   Hematocrit      35.0 - 48.0 % 38.3   Platelet Count      710.4 - 450.0 10(3)uL 354.0   MCV      80.0 - 100.0 fL 96.0   MCH      26.0 - 34.0 pg 30.3   MCHC      31.0 - 37.0 g/dL 31.6   RDW management     - monitor labs on quarterly basis  - have discussed dialysis/transplant options in past      2. BP is controlled; continue amlodipine/inderal;   + cough- ?  If related to lisinopril; will switch out to losartan 25 mg daily (asked pt to give h

## 2021-12-26 ENCOUNTER — PATIENT MESSAGE (OUTPATIENT)
Dept: RHEUMATOLOGY | Facility: CLINIC | Age: 26
End: 2021-12-26

## 2021-12-26 DIAGNOSIS — Z83.49 FAMILY HISTORY OF AMYLOIDOSIS: Primary | ICD-10-CM

## 2021-12-26 DIAGNOSIS — R76.8 POSITIVE ANA (ANTINUCLEAR ANTIBODY): ICD-10-CM

## 2021-12-26 DIAGNOSIS — N26.9 GLOMERULOSCLEROSIS: ICD-10-CM

## 2022-01-03 ENCOUNTER — TELEPHONE (OUTPATIENT)
Dept: FAMILY MEDICINE CLINIC | Facility: CLINIC | Age: 27
End: 2022-01-03

## 2022-01-03 DIAGNOSIS — F41.0 EPISODIC PAROXYSMAL ANXIETY DISORDER: ICD-10-CM

## 2022-01-03 PROBLEM — U07.1 COVID-19: Status: ACTIVE | Noted: 2022-01-03

## 2022-01-03 LAB — AMB EXT COVID-19 RESULT: DETECTED

## 2022-01-03 RX ORDER — ALPRAZOLAM 0.5 MG/1
0.5 TABLET ORAL 3 TIMES DAILY PRN
Qty: 30 TABLET | Refills: 0 | Status: SHIPPED | OUTPATIENT
Start: 2022-01-03

## 2022-01-03 NOTE — TELEPHONE ENCOUNTER
----- Message from Sandoval Castaneda MD sent at 1/3/2022  8:17 AM CST -----  Covid positive, please update banner

## 2022-01-03 NOTE — TELEPHONE ENCOUNTER
Alprazolam: 11/5/21       Return in 1 year (on 12/17/2022). Future appt:     Your appointments     Date & Time Appointment Department [facility])    Jan 17, 2022  1:30 PM CST Sleep Consult with Cheri Mccauley, [address] [place] [address]

## 2022-01-24 RX ORDER — PROPRANOLOL HCL 60 MG
1 CAPSULE, EXTENDED RELEASE 24HR ORAL DAILY
Qty: 90 CAPSULE | Refills: 1 | Status: SHIPPED | OUTPATIENT
Start: 2022-01-24

## 2022-02-14 RX ORDER — NORGESTIMATE AND ETHINYL ESTRADIOL 7DAYSX3 28
1 KIT ORAL DAILY
Qty: 84 TABLET | Refills: 3 | Status: SHIPPED | OUTPATIENT
Start: 2022-02-14

## 2022-03-04 RX ORDER — ALPRAZOLAM 0.5 MG/1
0.5 TABLET ORAL 3 TIMES DAILY PRN
Qty: 30 TABLET | Refills: 0 | Status: SHIPPED | OUTPATIENT
Start: 2022-03-04

## 2022-03-04 NOTE — TELEPHONE ENCOUNTER
Future appt:    Last Appointment with provider:   12/17/2021 for annual physical. Return in 1 year. Last appointment at [facility]:  12/17/2021  Cholesterol, Total (mg/dL)   Date Value   12/16/2020 284 (H)     HDL Cholesterol (mg/dL)   Date Value   12/16/2020 49     LDL Cholesterol (no units)   Date Value   12/16/2020      Comment:     Unable to calculate LDL due to Triglycerides >400.0 mg/dL      Desirable <100 mg/dL   Borderline 100-129 mg/dL   High     >=130mg/dL         Triglycerides (mg/dL)   Date Value   12/16/2020 440 (H)     Lab Results   Component Value Date     03/03/2021    A1C 5.7 (H) 03/03/2021     Lab Results   Component Value Date    TSH 1.820 12/16/2020       No follow-ups on file.

## 2022-03-25 ENCOUNTER — TELEPHONE (OUTPATIENT)
Dept: NEPHROLOGY | Facility: CLINIC | Age: 27
End: 2022-03-25

## 2022-03-25 RX ORDER — LOSARTAN POTASSIUM 25 MG/1
25 TABLET ORAL DAILY
Qty: 30 TABLET | Refills: 3 | Status: SHIPPED | OUTPATIENT
Start: 2022-03-25 | End: 2022-03-31 | Stop reason: ALTCHOICE

## 2022-03-25 RX ORDER — AMLODIPINE BESYLATE 5 MG/1
5 TABLET ORAL DAILY
Qty: 30 TABLET | Refills: 3 | OUTPATIENT
Start: 2022-03-25

## 2022-03-25 RX ORDER — PROPRANOLOL HCL 60 MG
1 CAPSULE, EXTENDED RELEASE 24HR ORAL DAILY
Qty: 90 CAPSULE | Refills: 1 | OUTPATIENT
Start: 2022-03-25

## 2022-03-28 ENCOUNTER — TELEPHONE (OUTPATIENT)
Dept: FAMILY MEDICINE CLINIC | Facility: CLINIC | Age: 27
End: 2022-03-28

## 2022-03-28 NOTE — TELEPHONE ENCOUNTER
pt having bad abdominal pain- has appt with netta thursday for food sensitivity issues- asking to be seen today, nothing avail,  is asking if she should go to ER

## 2022-03-28 NOTE — TELEPHONE ENCOUNTER
Pt states she is in severe abdominal pain/cramping. Pain is 10/10. Pain wraps around to lower back. Pt states she can hardly move and didn't sleep well. She is having diarrhea with vomiting. Advised pt to go to the ER due to severe pain. Pt verbalized understanding.

## 2022-03-31 ENCOUNTER — OFFICE VISIT (OUTPATIENT)
Dept: FAMILY MEDICINE CLINIC | Facility: CLINIC | Age: 27
End: 2022-03-31
Payer: COMMERCIAL

## 2022-03-31 VITALS
BODY MASS INDEX: 37.8 KG/M2 | HEART RATE: 90 BPM | TEMPERATURE: 99 F | HEIGHT: 68 IN | DIASTOLIC BLOOD PRESSURE: 86 MMHG | OXYGEN SATURATION: 97 % | SYSTOLIC BLOOD PRESSURE: 138 MMHG | WEIGHT: 249.38 LBS | RESPIRATION RATE: 16 BRPM

## 2022-03-31 DIAGNOSIS — E28.2 PCOS (POLYCYSTIC OVARIAN SYNDROME): Primary | ICD-10-CM

## 2022-03-31 DIAGNOSIS — I16.1 HYPERTENSIVE EMERGENCY WITHOUT CONGESTIVE HEART FAILURE: ICD-10-CM

## 2022-03-31 DIAGNOSIS — R10.84 ABDOMINAL PAIN, GENERALIZED: ICD-10-CM

## 2022-03-31 DIAGNOSIS — N18.4 STAGE 4 CHRONIC KIDNEY DISEASE (HCC): ICD-10-CM

## 2022-03-31 PROCEDURE — 3008F BODY MASS INDEX DOCD: CPT | Performed by: NURSE PRACTITIONER

## 2022-03-31 PROCEDURE — 3079F DIAST BP 80-89 MM HG: CPT | Performed by: NURSE PRACTITIONER

## 2022-03-31 PROCEDURE — 3075F SYST BP GE 130 - 139MM HG: CPT | Performed by: NURSE PRACTITIONER

## 2022-03-31 PROCEDURE — 99214 OFFICE O/P EST MOD 30 MIN: CPT | Performed by: NURSE PRACTITIONER

## 2022-03-31 RX ORDER — ACETAMINOPHEN 500 MG
500 TABLET ORAL
COMMUNITY
Start: 2022-03-30

## 2022-03-31 RX ORDER — HYDRALAZINE HYDROCHLORIDE 50 MG/1
50 TABLET, FILM COATED ORAL 3 TIMES DAILY
COMMUNITY
Start: 2022-03-30

## 2022-03-31 RX ORDER — SODIUM BICARBONATE 650 MG/1
650 TABLET ORAL 2 TIMES DAILY
COMMUNITY
Start: 2022-03-30

## 2022-04-01 NOTE — PATIENT INSTRUCTIONS
Labs and ultrasound pending. Schedule appointment with Dr. Kota Casey, PCP, for hospital follow up. Consider going to [facility] due to multiple complications without a clear cause. Keep appointment for home sleep study with Dr. Ekaterina Rojas. Follow up based on labs and as needed.

## 2022-04-06 ENCOUNTER — HOSPITAL ENCOUNTER (OUTPATIENT)
Dept: ULTRASOUND IMAGING | Age: 27
Discharge: HOME | End: 2022-04-06
Attending: NURSE PRACTITIONER
Payer: COMMERCIAL

## 2022-04-06 ENCOUNTER — LAB ENCOUNTER (OUTPATIENT)
Dept: LAB | Age: 27
End: 2022-04-06
Attending: NURSE PRACTITIONER
Payer: COMMERCIAL

## 2022-04-06 ENCOUNTER — TELEPHONE (OUTPATIENT)
Dept: FAMILY MEDICINE CLINIC | Facility: CLINIC | Age: 27
End: 2022-04-06

## 2022-04-06 DIAGNOSIS — E28.2 PCOS (POLYCYSTIC OVARIAN SYNDROME): ICD-10-CM

## 2022-04-06 DIAGNOSIS — R10.84 ABDOMINAL PAIN, GENERALIZED: ICD-10-CM

## 2022-04-06 LAB
FSH SERPL-ACNC: 1.6 MIU/ML
LH SERPL-ACNC: 5.7 MIU/ML
TESTOST SERPL-MCNC: 28.04 NG/DL

## 2022-04-06 PROCEDURE — 86003 ALLG SPEC IGE CRUDE XTRC EA: CPT

## 2022-04-06 PROCEDURE — 76856 US EXAM PELVIC COMPLETE: CPT | Performed by: NURSE PRACTITIONER

## 2022-04-06 PROCEDURE — 82785 ASSAY OF IGE: CPT

## 2022-04-06 PROCEDURE — 76830 TRANSVAGINAL US NON-OB: CPT | Performed by: NURSE PRACTITIONER

## 2022-04-06 PROCEDURE — 84403 ASSAY OF TOTAL TESTOSTERONE: CPT

## 2022-04-06 PROCEDURE — 36415 COLL VENOUS BLD VENIPUNCTURE: CPT

## 2022-04-06 PROCEDURE — 83002 ASSAY OF GONADOTROPIN (LH): CPT

## 2022-04-06 PROCEDURE — 83001 ASSAY OF GONADOTROPIN (FSH): CPT

## 2022-04-06 NOTE — TELEPHONE ENCOUNTER
----- Message from JOHNSON Bourne sent at 4/6/2022  3:04 PM CDT -----  Pelvic ultrasound is normal.  Please let patient know. Thank you.   Note to Rosenana

## 2022-04-07 ENCOUNTER — TELEPHONE (OUTPATIENT)
Dept: FAMILY MEDICINE CLINIC | Facility: CLINIC | Age: 27
End: 2022-04-07

## 2022-04-07 LAB
A ALTERNATA IGE QN: 0.22 KUA/L (ref ?–0.1)
A FUMIGATUS IGE QN: <0.1 KUA/L (ref ?–0.1)
AMER SYCAMORE IGE QN: 4.34 KUA/L (ref ?–0.1)
BERMUDA GRASS IGE QN: 3.62 KUA/L (ref ?–0.1)
BOXELDER IGE QN: 10.7 KUA/L (ref ?–0.1)
C HERBARUM IGE QN: <0.1 KUA/L (ref ?–0.1)
CALIF WALNUT IGE QN: 7.05 KUA/L (ref ?–0.1)
CAT DANDER IGE QN: <0.1 KUA/L (ref ?–0.1)
CLAM IGE QN: 0.16 KUA/L (ref ?–0.1)
CMN PIGWEED IGE QN: 5.8 KUA/L (ref ?–0.1)
CODFISH IGE QN: <0.1 KUA/L (ref ?–0.1)
COMMON RAGWEED IGE QN: 2.31 KUA/L (ref ?–0.1)
CORN IGE QN: 0.9 KUA/L (ref ?–0.1)
COTTONWOOD IGE QN: 5.7 KUA/L (ref ?–0.1)
COW MILK IGE QN: 0.18 KUA/L (ref ?–0.1)
D FARINAE IGE QN: 1.41 KUA/L (ref ?–0.1)
D PTERONYSS IGE QN: 1.26 KUA/L (ref ?–0.1)
DOG DANDER IGE QN: <0.1 KUA/L (ref ?–0.1)
EGG WHITE IGE QN: <0.1 KUA/L (ref ?–0.1)
IGE SERPL-ACNC: 271 KU/L (ref 2–214)
IGE SERPL-ACNC: 271 KU/L (ref 2–214)
M RACEMOSUS IGE QN: <0.1 KUA/L (ref ?–0.1)
MARSH ELDER IGE QN: 0.37 KUA/L (ref ?–0.1)
MOUSE EPITH IGE QN: <0.1 KUA/L (ref ?–0.1)
MT JUNIPER IGE QN: 1.51 KUA/L (ref ?–0.1)
P NOTATUM IGE QN: <0.1 KUA/L (ref ?–0.1)
PEANUT IGE QN: 1.05 KUA/L (ref ?–0.1)
PECAN/HICK TREE IGE QN: 6.26 KUA/L (ref ?–0.1)
ROACH IGE QN: 0.65 KUA/L (ref ?–0.1)
SALTWORT IGE QN: 4.66 KUA/L (ref ?–0.1)
SCALLOP IGE QN: 0.18 KUA/L (ref ?–0.1)
SESAME SEED IGE QN: 1.86 KUA/L (ref ?–0.1)
SHRIMP IGE QN: 0.43 KUA/L (ref ?–0.1)
SOYBEAN IGE QN: 0.67 KUA/L (ref ?–0.1)
TIMOTHY IGE QN: 51.1 KUA/L (ref ?–0.1)
WALNUT IGE QN: 0.45 KUA/L (ref ?–0.1)
WHEAT IGE QN: 0.66 KUA/L (ref ?–0.1)
WHITE ASH IGE QN: 8.33 KUA/L (ref ?–0.1)
WHITE ELM IGE QN: 7.15 KUA/L (ref ?–0.1)
WHITE MULBERRY IGE QN: 0.35 KUA/L (ref ?–0.1)
WHITE OAK IGE QN: 2.37 KUA/L (ref ?–0.1)

## 2022-04-07 NOTE — TELEPHONE ENCOUNTER
----- Message from JOHNSON Toro sent at 4/7/2022  8:05 AM CDT -----  Allergy testing pending. Hormone levels are normal.   Note to MyChart.

## 2022-04-07 NOTE — TELEPHONE ENCOUNTER
----- Message from JOHNSON Britt sent at 4/7/2022  3:47 PM CDT -----  Results show positive to multiple allergens. Recommend to see allergist. Avoid peanuts and sesame seeds in the meantime. Also highly allergic to Michele grass, pecan, cottonwood tree, and common pigweed. Referral done to Dr. Kayleigh Aleman. Note to MyChart.

## 2022-04-27 ENCOUNTER — OFFICE VISIT (OUTPATIENT)
Dept: FAMILY MEDICINE CLINIC | Facility: CLINIC | Age: 27
End: 2022-04-27
Payer: COMMERCIAL

## 2022-04-27 VITALS
BODY MASS INDEX: 38.65 KG/M2 | HEIGHT: 68 IN | TEMPERATURE: 98 F | RESPIRATION RATE: 18 BRPM | OXYGEN SATURATION: 95 % | DIASTOLIC BLOOD PRESSURE: 90 MMHG | SYSTOLIC BLOOD PRESSURE: 150 MMHG | HEART RATE: 96 BPM | WEIGHT: 255 LBS

## 2022-04-27 DIAGNOSIS — B37.9 CANDIDIASIS: Primary | ICD-10-CM

## 2022-04-27 PROCEDURE — 99213 OFFICE O/P EST LOW 20 MIN: CPT | Performed by: NURSE PRACTITIONER

## 2022-04-27 PROCEDURE — 3008F BODY MASS INDEX DOCD: CPT | Performed by: NURSE PRACTITIONER

## 2022-04-27 PROCEDURE — 87480 CANDIDA DNA DIR PROBE: CPT | Performed by: NURSE PRACTITIONER

## 2022-04-27 PROCEDURE — 87510 GARDNER VAG DNA DIR PROBE: CPT | Performed by: NURSE PRACTITIONER

## 2022-04-27 PROCEDURE — 87660 TRICHOMONAS VAGIN DIR PROBE: CPT | Performed by: NURSE PRACTITIONER

## 2022-04-27 PROCEDURE — 3077F SYST BP >= 140 MM HG: CPT | Performed by: NURSE PRACTITIONER

## 2022-04-27 PROCEDURE — 3080F DIAST BP >= 90 MM HG: CPT | Performed by: NURSE PRACTITIONER

## 2022-04-27 NOTE — PATIENT INSTRUCTIONS
Vaginal swab pending. We will treat based on results    Recommend a probiotic daily    Make sure to take blood pressure medicine when get home    Follow-up as needed.

## 2022-04-28 ENCOUNTER — TELEPHONE (OUTPATIENT)
Dept: FAMILY MEDICINE CLINIC | Facility: CLINIC | Age: 27
End: 2022-04-28

## 2022-04-28 RX ORDER — FLUCONAZOLE 150 MG/1
150 TABLET ORAL ONCE
Qty: 1 TABLET | Refills: 0 | Status: SHIPPED | OUTPATIENT
Start: 2022-04-28 | End: 2022-04-28

## 2022-04-28 RX ORDER — ALPRAZOLAM 0.5 MG/1
0.5 TABLET ORAL 3 TIMES DAILY PRN
Qty: 30 TABLET | Refills: 0 | Status: SHIPPED | OUTPATIENT
Start: 2022-04-28

## 2022-04-28 NOTE — TELEPHONE ENCOUNTER
----- Message from JOHNSON Drake sent at 4/28/2022  7:44 AM CDT -----  Vaginal swab is positive for yeast. Prescriptions for Diflucan and Terazol sent to [facility]. Note to MyChart.

## 2022-04-28 NOTE — TELEPHONE ENCOUNTER
Future appt: Your appointments     Date & Time Appointment Department [facility])    May 10, 2022 11:00 AM CDT Consult with Deepa Montiel MD [facility], [facility], [address] [place] [address] Gill Dkue (1951 [facility])    Cindy arrive 15 min. prior to your appointment to complete your registration. Bring your ID, Insuance card, co-pay amount and test results. **If patient has an HMO insurance: Please bring your referral**  Please inform patients to be off their antihistamines 5 days prior to their appointment if they can. [address], [facility]  [phone]        Last Appointment with provider:   4/27/2022  Last appointment at [facility]:  4/27/2022  Cholesterol, Total (mg/dL)   Date Value   12/16/2020 284 (H)     HDL Cholesterol (mg/dL)   Date Value   12/16/2020 49     LDL Cholesterol (no units)   Date Value   12/16/2020      Comment:     Unable to calculate LDL due to Triglycerides >400.0 mg/dL      Desirable <100 mg/dL   Borderline 100-129 mg/dL   High     >=130mg/dL         Triglycerides (mg/dL)   Date Value   12/16/2020 440 (H)     Lab Results   Component Value Date     03/03/2021    A1C 5.7 (H) 03/03/2021     Lab Results   Component Value Date    TSH 1.820 12/16/2020       No follow-ups on file.

## 2022-05-09 RX ORDER — NORGESTIMATE AND ETHINYL ESTRADIOL 7DAYSX3 28
1 KIT ORAL DAILY
Qty: 84 TABLET | Refills: 3 | OUTPATIENT
Start: 2022-05-09

## 2022-05-09 NOTE — TELEPHONE ENCOUNTER
Future appt: Your appointments     Date & Time Appointment Department [facility])    May 10, 2022 11:00 AM CDT Consult with Macie Fitzgerald MD [facility], [facility], [address] [facility] [place] ([address] [facility])    Cinyd arrive 15 min. prior to your appointment to complete your registration. Bring your ID, Insuance card, co-pay amount and test results. **If patient has an HMO insurance: Please bring your referral**  Please inform patients to be off their antihistamines 5 days prior to their appointment if they can. [facility], [facility]  [address] [address]  [phone]        Last Appointment with provider:   3/31/22 PCOS  Last appointment at [facility]:  4/27/2022  Cholesterol, Total (mg/dL)   Date Value   12/16/2020 284 (H)     HDL Cholesterol (mg/dL)   Date Value   12/16/2020 49     LDL Cholesterol (no units)   Date Value   12/16/2020      Comment:     Unable to calculate LDL due to Triglycerides >400.0 mg/dL      Desirable <100 mg/dL   Borderline 100-129 mg/dL   High     >=130mg/dL         Triglycerides (mg/dL)   Date Value   12/16/2020 440 (H)     Lab Results   Component Value Date     03/03/2021    A1C 5.7 (H) 03/03/2021     Lab Results   Component Value Date    TSH 1.820 12/16/2020       No follow-ups on file.

## 2022-05-10 ENCOUNTER — OFFICE VISIT (OUTPATIENT)
Dept: ALLERGY | Facility: CLINIC | Age: 27
End: 2022-05-10
Payer: COMMERCIAL

## 2022-05-10 ENCOUNTER — NURSE ONLY (OUTPATIENT)
Dept: ALLERGY | Facility: CLINIC | Age: 27
End: 2022-05-10
Payer: COMMERCIAL

## 2022-05-10 VITALS
OXYGEN SATURATION: 98 % | SYSTOLIC BLOOD PRESSURE: 145 MMHG | HEART RATE: 91 BPM | HEIGHT: 68 IN | RESPIRATION RATE: 20 BRPM | DIASTOLIC BLOOD PRESSURE: 84 MMHG | BODY MASS INDEX: 38.8 KG/M2 | WEIGHT: 256 LBS

## 2022-05-10 DIAGNOSIS — R19.7 DIARRHEA, UNSPECIFIED TYPE: ICD-10-CM

## 2022-05-10 DIAGNOSIS — J30.2 SEASONAL AND PERENNIAL ALLERGIC RHINOCONJUNCTIVITIS OF BOTH EYES: Primary | ICD-10-CM

## 2022-05-10 DIAGNOSIS — Z92.29 COVID-19 VACCINE SERIES COMPLETED: ICD-10-CM

## 2022-05-10 DIAGNOSIS — T78.1XXA ADVERSE FOOD REACTION, INITIAL ENCOUNTER: ICD-10-CM

## 2022-05-10 DIAGNOSIS — Z91.018 FOOD ALLERGY: ICD-10-CM

## 2022-05-10 DIAGNOSIS — J30.89 SEASONAL AND PERENNIAL ALLERGIC RHINOCONJUNCTIVITIS OF BOTH EYES: Primary | ICD-10-CM

## 2022-05-10 DIAGNOSIS — R14.0 ABDOMINAL BLOATING: ICD-10-CM

## 2022-05-10 DIAGNOSIS — H10.13 SEASONAL AND PERENNIAL ALLERGIC RHINOCONJUNCTIVITIS OF BOTH EYES: Primary | ICD-10-CM

## 2022-05-10 PROCEDURE — 95004 PERQ TESTS W/ALRGNC XTRCS: CPT | Performed by: ALLERGY & IMMUNOLOGY

## 2022-05-10 PROCEDURE — 3079F DIAST BP 80-89 MM HG: CPT | Performed by: ALLERGY & IMMUNOLOGY

## 2022-05-10 PROCEDURE — 3008F BODY MASS INDEX DOCD: CPT | Performed by: ALLERGY & IMMUNOLOGY

## 2022-05-10 PROCEDURE — 99244 OFF/OP CNSLTJ NEW/EST MOD 40: CPT | Performed by: ALLERGY & IMMUNOLOGY

## 2022-05-10 PROCEDURE — 3077F SYST BP >= 140 MM HG: CPT | Performed by: ALLERGY & IMMUNOLOGY

## 2022-05-10 RX ORDER — LEVOCETIRIZINE DIHYDROCHLORIDE 5 MG/1
5 TABLET, FILM COATED ORAL NIGHTLY
Qty: 90 TABLET | Refills: 0 | Status: SHIPPED | OUTPATIENT
Start: 2022-05-10

## 2022-05-10 RX ORDER — FLUTICASONE PROPIONATE 50 MCG
2 SPRAY, SUSPENSION (ML) NASAL DAILY
Qty: 3 EACH | Refills: 0 | Status: SHIPPED | OUTPATIENT
Start: 2022-05-10

## 2022-05-10 NOTE — PATIENT INSTRUCTIONS
#1 allergic rhinitis  Reviewed avoidance measures and recent serum IgE testing showing sensitization to trees grass ragweed weeds mold and dust mite  Reviewed avoidance measures and potential treatment options immunotherapy  Consider trial of Xyzal, levocetirizine 5 mg once a day as an antihistamine as needed if having significant runny nose sneezing itchy watery eyes  Trial of Flonase 2 sprays per nostril once a day. Can take up to 3 to 5 days to take full effect and should be used daily during peak season to prevent symptoms. #2 adverse food reaction  See above skin testing to screen for an IgE mediated process based upon foods that showed IgE production on recent serum IgE testing to food common food allergens. Patient denies acute reaction including hives rashes lip swelling or tongue swelling with immediate foods that showed IgE production. Patient more concerned about potential GI issues including abdominal pain and bloating. May try introducing those foods that were negative on skin testing from an allergy perspective. Reviewed potential food intolerances. Most concerned about milk and wheat as potential food intolerances. Patient notes symptom improvement with avoiding some of these foods including nuts and sesame seeds. May continue to avoid those foods that cause unwanted GI symptoms if negative skin testing is most likely a food intolerance    #3 COVID-vaccine up-to-date x2 doses. Recommend booster dose    #4 abdominal bloating gas diarrhea  Prior GI evaluation including colonoscopy and EGD with her GI physician in the past.  Celiac testing was negative. Trial of FODMAP diet from a food intolerance perspective  Reviewed potential IBS component.   Information provided and handouts

## 2022-07-16 DIAGNOSIS — F41.0 EPISODIC PAROXYSMAL ANXIETY DISORDER: ICD-10-CM

## 2022-07-18 NOTE — TELEPHONE ENCOUNTER
Future appt:    Last Appointment with provider:   4/27/2022 for vaginal problem. 3/31/22 for PCOS. Last physical was on 12/7/21. Last appointment at [facility]:  4/27/2022  Cholesterol, Total (mg/dL)   Date Value   12/16/2020 284 (H)     HDL Cholesterol (mg/dL)   Date Value   12/16/2020 49     LDL Cholesterol (no units)   Date Value   12/16/2020      Comment:     Unable to calculate LDL due to Triglycerides >400.0 mg/dL      Desirable <100 mg/dL   Borderline 100-129 mg/dL   High     >=130mg/dL         Triglycerides (mg/dL)   Date Value   12/16/2020 440 (H)     Lab Results   Component Value Date     03/03/2021    A1C 5.7 (H) 03/03/2021     Lab Results   Component Value Date    TSH 1.820 12/16/2020       No follow-ups on file.

## 2022-07-20 DIAGNOSIS — F41.0 EPISODIC PAROXYSMAL ANXIETY DISORDER: ICD-10-CM

## 2022-07-20 RX ORDER — ALPRAZOLAM 0.5 MG/1
0.5 TABLET ORAL 3 TIMES DAILY PRN
Qty: 30 TABLET | Refills: 0 | Status: SHIPPED | OUTPATIENT
Start: 2022-07-20

## 2022-07-20 NOTE — TELEPHONE ENCOUNTER
Future appt:    Last Appointment with provider:   4/27/2022 yeast infection. 3/31/22- PCOS follow up  Last appointment at [facility]:  4/27/2022  Cholesterol, Total (mg/dL)   Date Value   12/16/2020 284 (H)     HDL Cholesterol (mg/dL)   Date Value   12/16/2020 49     LDL Cholesterol (no units)   Date Value   12/16/2020      Comment:     Unable to calculate LDL due to Triglycerides >400.0 mg/dL      Desirable <100 mg/dL   Borderline 100-129 mg/dL   High     >=130mg/dL         Triglycerides (mg/dL)   Date Value   12/16/2020 440 (H)     Lab Results   Component Value Date     03/03/2021    A1C 5.7 (H) 03/03/2021     Lab Results   Component Value Date    TSH 1.820 12/16/2020       No follow-ups on file.

## 2022-07-22 RX ORDER — ALPRAZOLAM 0.5 MG/1
0.5 TABLET ORAL 3 TIMES DAILY PRN
Qty: 30 TABLET | Refills: 0 | OUTPATIENT
Start: 2022-07-22

## 2022-08-01 DIAGNOSIS — E28.2 PCOS (POLYCYSTIC OVARIAN SYNDROME): ICD-10-CM

## 2022-08-01 RX ORDER — NORGESTIMATE AND ETHINYL ESTRADIOL 7DAYSX3 28
1 KIT ORAL DAILY
Qty: 84 TABLET | Refills: 3 | OUTPATIENT
Start: 2022-08-01

## 2022-08-16 ENCOUNTER — TELEPHONE (OUTPATIENT)
Dept: FAMILY MEDICINE CLINIC | Facility: CLINIC | Age: 27
End: 2022-08-16

## 2022-08-16 NOTE — TELEPHONE ENCOUNTER
Spoke with pt. Pt states she was admitted last week for blood clots in her leg, pt also had clot travel to left lung. Pt states she was on Heparin for 3 days at the hospital, pt is now on Eliquis. Pt mentioned she also has stage 4 renal failure. Pt is c/o of pain behind her rib cage on left side, pain also radiates to her left shoulder. Pt states she has one tablet of Prednisone left, pt is worried once Prednisone ends, is worried about pain becoming more intense. Pt states Tylenol doesn't help. Pt has not scheduled post hospital appt hus far. Pt states she is waiting to hear back from hematology- needs to get in for post hospital appt  Pt also will be following up with kidney specialist.    Pt states CR is PCP, however, pt has not seen CR. Pt states she was told to expect pain, however, pt states she is concerned that she may need something stronger pain. Pt informed appt is needed  Pt scheduled for appt tomorrow.     Future Appointments   Date Time Provider Eunice Graves   8/17/2022  9:00 AM Lamont Rodriguez MD [facility]

## 2022-08-16 NOTE — TELEPHONE ENCOUNTER
Reviewed- new patient to me.  Patient should be scheduling appointment with pulmonary for Pulmonary emboli followup as well  As hematology and nephrology

## 2022-08-17 ENCOUNTER — OFFICE VISIT (OUTPATIENT)
Dept: FAMILY MEDICINE CLINIC | Facility: CLINIC | Age: 27
End: 2022-08-17
Payer: COMMERCIAL

## 2022-08-17 VITALS
OXYGEN SATURATION: 96 % | HEART RATE: 96 BPM | BODY MASS INDEX: 39.1 KG/M2 | DIASTOLIC BLOOD PRESSURE: 92 MMHG | SYSTOLIC BLOOD PRESSURE: 138 MMHG | RESPIRATION RATE: 18 BRPM | HEIGHT: 68 IN | TEMPERATURE: 98 F | WEIGHT: 258 LBS

## 2022-08-17 DIAGNOSIS — Z30.09 ENCOUNTER FOR OTHER GENERAL COUNSELING OR ADVICE ON CONTRACEPTION: ICD-10-CM

## 2022-08-17 DIAGNOSIS — I82.412 ACUTE DEEP VEIN THROMBOSIS (DVT) OF FEMORAL VEIN OF LEFT LOWER EXTREMITY (HCC): ICD-10-CM

## 2022-08-17 DIAGNOSIS — I26.93 SINGLE SUBSEGMENTAL PULMONARY EMBOLISM WITHOUT ACUTE COR PULMONALE (HCC): Primary | ICD-10-CM

## 2022-08-17 DIAGNOSIS — N18.4 STAGE 4 CHRONIC KIDNEY DISEASE (HCC): ICD-10-CM

## 2022-08-17 PROBLEM — I26.99 PULMONARY INFARCT (HCC): Status: ACTIVE | Noted: 2022-08-11

## 2022-08-17 PROBLEM — R10.84 ABDOMINAL PAIN, GENERALIZED: Status: RESOLVED | Noted: 2021-04-21 | Resolved: 2022-08-17

## 2022-08-17 PROBLEM — Z30.9 CONTRACEPTIVE MANAGEMENT: Status: ACTIVE | Noted: 2022-08-17

## 2022-08-17 PROBLEM — R10.13 ABDOMINAL PAIN, EPIGASTRIC: Status: RESOLVED | Noted: 2021-04-21 | Resolved: 2022-08-17

## 2022-08-17 PROBLEM — R19.7 DIARRHEA: Status: RESOLVED | Noted: 2021-03-03 | Resolved: 2022-08-17

## 2022-08-17 PROBLEM — U07.1 COVID-19: Status: RESOLVED | Noted: 2022-01-03 | Resolved: 2022-08-17

## 2022-08-17 PROBLEM — I16.1 HYPERTENSIVE EMERGENCY WITHOUT CONGESTIVE HEART FAILURE: Status: RESOLVED | Noted: 2022-03-28 | Resolved: 2022-08-17

## 2022-08-17 PROCEDURE — 99214 OFFICE O/P EST MOD 30 MIN: CPT | Performed by: FAMILY MEDICINE

## 2022-08-17 PROCEDURE — 3075F SYST BP GE 130 - 139MM HG: CPT | Performed by: FAMILY MEDICINE

## 2022-08-17 PROCEDURE — 3008F BODY MASS INDEX DOCD: CPT | Performed by: FAMILY MEDICINE

## 2022-08-17 PROCEDURE — 3080F DIAST BP >= 90 MM HG: CPT | Performed by: FAMILY MEDICINE

## 2022-08-17 RX ORDER — APIXABAN 5 MG/1
TABLET, FILM COATED ORAL
COMMUNITY
Start: 2022-08-14

## 2022-08-17 RX ORDER — PREDNISONE 20 MG/1
20 TABLET ORAL DAILY
Qty: 5 TABLET | Refills: 0 | Status: SHIPPED | OUTPATIENT
Start: 2022-08-17

## 2022-08-17 RX ORDER — PREDNISONE 20 MG/1
20 TABLET ORAL DAILY
COMMUNITY
Start: 2022-08-12 | End: 2022-08-17

## 2022-08-17 RX ORDER — PROPRANOLOL HYDROCHLORIDE 20 MG/1
20 TABLET ORAL
COMMUNITY
Start: 2022-05-25

## 2022-08-17 RX ORDER — AMLODIPINE BESYLATE 10 MG/1
10 TABLET ORAL DAILY
COMMUNITY
Start: 2022-05-25

## 2022-08-17 RX ORDER — HYDROCODONE BITARTRATE AND ACETAMINOPHEN 5; 325 MG/1; MG/1
1 TABLET ORAL EVERY 8 HOURS PRN
Qty: 30 TABLET | Refills: 0 | Status: SHIPPED | OUTPATIENT
Start: 2022-08-17

## 2022-08-17 NOTE — PATIENT INSTRUCTIONS
[address] De[address]re Godoy for 5 days more prednisone to decrease inflammatory pain    norco at night for severe pain only    Followup nephrology, hematology and pulmonary    Condoms for contraception now- consider copper IUD or tubal

## 2022-08-28 DIAGNOSIS — F41.0 EPISODIC PAROXYSMAL ANXIETY DISORDER: ICD-10-CM

## 2022-08-29 RX ORDER — ALPRAZOLAM 0.5 MG/1
0.5 TABLET ORAL 3 TIMES DAILY PRN
Qty: 30 TABLET | Refills: 0 | Status: SHIPPED | OUTPATIENT
Start: 2022-08-29

## 2022-08-29 NOTE — TELEPHONE ENCOUNTER
Future appt: Your appointments     Date & Time Appointment Department [facility])    Sep 20, 2022  8:00 AM CDT Office Visit with aRffaele Casey, [address], [place] ([place])    Please arrive 15 minutes prior to your scheduled appointment. Please also bring your Insurance card, Photo ID, and your medication bottles or a list of your current medication. If your condition improves and this appointment is no longer needed, please contact your physician office to cancel. Please verify with your primary care provider if your insurance requires a referral.          [address] [facility]  [phone]        Last Appointment with provider:   4/27/2022 for vaginal problem. 3/31/22 for PCOS  Last appointment at [facility]:  8/17/2022  Cholesterol, Total (mg/dL)   Date Value   12/16/2020 284 (H)     HDL Cholesterol (mg/dL)   Date Value   12/16/2020 49     LDL Cholesterol (no units)   Date Value   12/16/2020      Comment:     Unable to calculate LDL due to Triglycerides >400.0 mg/dL      Desirable <100 mg/dL   Borderline 100-129 mg/dL   High     >=130mg/dL         Triglycerides (mg/dL)   Date Value   12/16/2020 440 (H)     Lab Results   Component Value Date     03/03/2021    A1C 5.7 (H) 03/03/2021     Lab Results   Component Value Date    TSH 1.820 12/16/2020       No follow-ups on file.

## 2022-09-16 ENCOUNTER — TELEPHONE (OUTPATIENT)
Dept: FAMILY MEDICINE CLINIC | Facility: CLINIC | Age: 27
End: 2022-09-16

## 2022-09-16 RX ORDER — APIXABAN 5 MG/1
5 TABLET, FILM COATED ORAL 2 TIMES DAILY
Qty: 180 TABLET | Refills: 1 | Status: SHIPPED | OUTPATIENT
Start: 2022-09-16

## 2022-09-16 NOTE — TELEPHONE ENCOUNTER
Pt was seen in office on 8/17/22. Pt is now taking Eliquis 5mg BID.   pt has one tablet left for tonight. Pt states she was told by nephrology to request refill from pharmacy, pharmacy redirected pt to PCP.   Pt would like 90 day supply      Future Appointments   Date Time Provider Eunice Graves   9/20/2022  8:00 AM Alcira Avalos APRN [facility]

## 2022-09-16 NOTE — TELEPHONE ENCOUNTER
Pt calling for a refill on Eliquis 5 MG which was prescribed for her when she was in the ER. Her dose has changed to 1 tab in AM and 1 tab in PM.    Pt asking PCP refill this and send to [facility] in [place]. Please advise.

## 2022-09-20 ENCOUNTER — MED REC SCAN ONLY (OUTPATIENT)
Dept: NEPHROLOGY | Facility: CLINIC | Age: 27
End: 2022-09-20

## 2022-09-20 ENCOUNTER — OFFICE VISIT (OUTPATIENT)
Dept: FAMILY MEDICINE CLINIC | Facility: CLINIC | Age: 27
End: 2022-09-20

## 2022-09-20 VITALS
TEMPERATURE: 97 F | RESPIRATION RATE: 18 BRPM | SYSTOLIC BLOOD PRESSURE: 122 MMHG | HEIGHT: 68 IN | BODY MASS INDEX: 38.8 KG/M2 | WEIGHT: 256 LBS | HEART RATE: 83 BPM | OXYGEN SATURATION: 96 % | DIASTOLIC BLOOD PRESSURE: 68 MMHG

## 2022-09-20 DIAGNOSIS — Z30.09 ENCOUNTER FOR OTHER GENERAL COUNSELING OR ADVICE ON CONTRACEPTION: Primary | ICD-10-CM

## 2022-09-20 DIAGNOSIS — N18.5 STAGE 5 CHRONIC KIDNEY DISEASE (HCC): ICD-10-CM

## 2022-09-20 DIAGNOSIS — I82.412 ACUTE DEEP VEIN THROMBOSIS (DVT) OF FEMORAL VEIN OF LEFT LOWER EXTREMITY (HCC): ICD-10-CM

## 2022-09-20 PROCEDURE — 3074F SYST BP LT 130 MM HG: CPT | Performed by: NURSE PRACTITIONER

## 2022-09-20 PROCEDURE — 3078F DIAST BP <80 MM HG: CPT | Performed by: NURSE PRACTITIONER

## 2022-09-20 PROCEDURE — 3008F BODY MASS INDEX DOCD: CPT | Performed by: NURSE PRACTITIONER

## 2022-09-20 PROCEDURE — 99212 OFFICE O/P EST SF 10 MIN: CPT | Performed by: NURSE PRACTITIONER

## 2022-09-20 NOTE — PATIENT INSTRUCTIONS
Schedule appointment with Dr. Vicente Conde or her gynecologist for possible copper IUD insertion. Otherwise follow-up for physical in December and as needed.

## 2022-10-14 DIAGNOSIS — F41.0 EPISODIC PAROXYSMAL ANXIETY DISORDER: ICD-10-CM

## 2022-10-14 RX ORDER — ALPRAZOLAM 0.5 MG/1
0.5 TABLET ORAL 3 TIMES DAILY PRN
Qty: 30 TABLET | Refills: 0 | Status: SHIPPED | OUTPATIENT
Start: 2022-10-14

## 2022-10-14 NOTE — TELEPHONE ENCOUNTER
Future appt: Your appointments     Date & Time Appointment Department [facility])    Oct 25, 2022  4:00 PM CDT Exam - Established with Dante Alvarado MD [address] [facility], [facility] ([place])            [address] [place] [address] [facility] [id number]  [phone]        Last Appointment with provider:   9/20/2022(contraception)-f/u in december  Last appointment at [facility]:  9/20/2022    ALPRAZolam Liu Canary) 0.5 MG Oral Tab    30tab  0refill        Filled:8/29/22 Summary: Take 1 tablet (0.5 mg total) by mouth 3 (three) times daily as needed for Anxiety          Cholesterol, Total (mg/dL)   Date Value   12/16/2020 284 (H)     HDL Cholesterol (mg/dL)   Date Value   12/16/2020 49     LDL Cholesterol (no units)   Date Value   12/16/2020      Comment:     Unable to calculate LDL due to Triglycerides >400.0 mg/dL      Desirable <100 mg/dL   Borderline 100-129 mg/dL   High     >=130mg/dL         Triglycerides (mg/dL)   Date Value   12/16/2020 440 (H)     Lab Results   Component Value Date     03/03/2021    A1C 5.7 (H) 03/03/2021     Lab Results   Component Value Date    TSH 1.820 12/16/2020       No follow-ups on file.

## 2022-10-25 ENCOUNTER — OFFICE VISIT (OUTPATIENT)
Dept: FAMILY MEDICINE CLINIC | Facility: CLINIC | Age: 27
End: 2022-10-25
Payer: COMMERCIAL

## 2022-10-25 VITALS
TEMPERATURE: 98 F | RESPIRATION RATE: 18 BRPM | OXYGEN SATURATION: 99 % | SYSTOLIC BLOOD PRESSURE: 122 MMHG | DIASTOLIC BLOOD PRESSURE: 86 MMHG | WEIGHT: 257.81 LBS | BODY MASS INDEX: 39.07 KG/M2 | HEART RATE: 78 BPM | HEIGHT: 68 IN

## 2022-10-25 DIAGNOSIS — Z30.014 ENCOUNTER FOR INITIAL PRESCRIPTION OF INTRAUTERINE CONTRACEPTIVE DEVICE (IUD): Primary | ICD-10-CM

## 2022-10-25 DIAGNOSIS — I26.93 SINGLE SUBSEGMENTAL PULMONARY EMBOLISM WITHOUT ACUTE COR PULMONALE (HCC): ICD-10-CM

## 2022-10-25 DIAGNOSIS — I26.99 PULMONARY INFARCT (HCC): ICD-10-CM

## 2022-10-25 DIAGNOSIS — I82.412 ACUTE DEEP VEIN THROMBOSIS (DVT) OF FEMORAL VEIN OF LEFT LOWER EXTREMITY (HCC): ICD-10-CM

## 2022-10-25 DIAGNOSIS — N26.9 GLOMERULOSCLEROSIS: ICD-10-CM

## 2022-10-25 DIAGNOSIS — I10 ESSENTIAL HYPERTENSION: ICD-10-CM

## 2022-10-25 DIAGNOSIS — N18.5 STAGE 5 CHRONIC KIDNEY DISEASE (HCC): ICD-10-CM

## 2022-10-25 PROBLEM — R80.9 PROTEINURIA: Status: ACTIVE | Noted: 2022-08-18

## 2022-10-25 PROBLEM — I12.9 HYPERTENSIVE RENAL DISEASE: Status: ACTIVE | Noted: 2022-08-18

## 2022-10-25 PROCEDURE — 3074F SYST BP LT 130 MM HG: CPT | Performed by: FAMILY MEDICINE

## 2022-10-25 PROCEDURE — 3079F DIAST BP 80-89 MM HG: CPT | Performed by: FAMILY MEDICINE

## 2022-10-25 PROCEDURE — 99212 OFFICE O/P EST SF 10 MIN: CPT | Performed by: FAMILY MEDICINE

## 2022-10-25 PROCEDURE — 3008F BODY MASS INDEX DOCD: CPT | Performed by: FAMILY MEDICINE

## 2022-10-25 RX ORDER — FLUCONAZOLE 150 MG/1
TABLET ORAL
COMMUNITY

## 2022-10-25 RX ORDER — MAGNESIUM GLUCONATE 27 MG(500)
TABLET ORAL
COMMUNITY

## 2022-10-25 RX ORDER — CALCITRIOL 0.25 UG/1
CAPSULE, LIQUID FILLED ORAL
COMMUNITY

## 2022-10-25 RX ORDER — HYDROXYZINE HYDROCHLORIDE 25 MG/1
TABLET, FILM COATED ORAL
COMMUNITY

## 2022-10-25 NOTE — PATIENT INSTRUCTIONS
rec gyne referral re IUD, coppor IUD should be ok.  I would advise against hormonal due to prior dvt while on [facility]    Continue nephrology and transplant team care

## 2022-12-19 DIAGNOSIS — F41.0 EPISODIC PAROXYSMAL ANXIETY DISORDER: ICD-10-CM

## 2022-12-19 RX ORDER — AMLODIPINE BESYLATE 10 MG/1
10 TABLET ORAL DAILY
Refills: 0 | Status: CANCELLED | OUTPATIENT
Start: 2022-12-19

## 2022-12-19 RX ORDER — HYDROXYZINE HYDROCHLORIDE 25 MG/1
25 TABLET, FILM COATED ORAL EVERY 8 HOURS PRN
Qty: 30 TABLET | Refills: 0 | Status: CANCELLED | OUTPATIENT
Start: 2022-12-19

## 2022-12-19 RX ORDER — PROPRANOLOL HYDROCHLORIDE 20 MG/1
20 TABLET ORAL
Refills: 0 | Status: CANCELLED | OUTPATIENT
Start: 2022-12-19

## 2022-12-19 RX ORDER — HYDRALAZINE HYDROCHLORIDE 50 MG/1
50 TABLET, FILM COATED ORAL 3 TIMES DAILY
Refills: 0 | Status: CANCELLED | OUTPATIENT
Start: 2022-12-19

## 2022-12-19 NOTE — TELEPHONE ENCOUNTER
Last refill 10/14/22 #30 w/0 refills    Future appt: Your appointments     Date & Time Appointment Department [facility])    Jan 03, 2023  8:30 AM CST Physical - Established with Mare Paniagua MD 25 [facility] ([facility])            [address] [facility] [place]  [address] [phone]  [phone]        Last Appointment with provider:   9/20/2022  Last appointment at [facility] [place]:  10/25/2022  Cholesterol, Total (mg/dL)   Date Value   12/16/2020 284 (H)     HDL Cholesterol (mg/dL)   Date Value   12/16/2020 49     LDL Cholesterol (no units)   Date Value   12/16/2020      Comment:     Unable to calculate LDL due to Triglycerides >400.0 mg/dL      Desirable <100 mg/dL   Borderline 100-129 mg/dL   High     >=130mg/dL         Triglycerides (mg/dL)   Date Value   12/16/2020 440 (H)     Lab Results   Component Value Date     03/03/2021    A1C 5.7 (H) 03/03/2021     Lab Results   Component Value Date    TSH 1.820 12/16/2020       No follow-ups on file.

## 2022-12-19 NOTE — TELEPHONE ENCOUNTER
Last hydroxyzine refill unknown (external)    Last propranolol refill 5/25/22 (external)    Last amlodipine refill 5/25/22 (external)    Last hydralazine refill 3/30/22 (external)      Future appt: Your appointments     Date & Time Appointment Department [facility])    Jan 03, 2023  8:30 AM CST Physical - Established with Lamont Rodriguez MD 25 [facility] ([facility])            25 [facility] [place]  [address] [address] [phone]  [phone]        Last Appointment with provider:   10/25/2022  Last appointment at [facility] [place]:  10/25/2022  Cholesterol, Total (mg/dL)   Date Value   12/16/2020 284 (H)     HDL Cholesterol (mg/dL)   Date Value   12/16/2020 49     LDL Cholesterol (no units)   Date Value   12/16/2020      Comment:     Unable to calculate LDL due to Triglycerides >400.0 mg/dL      Desirable <100 mg/dL   Borderline 100-129 mg/dL   High     >=130mg/dL         Triglycerides (mg/dL)   Date Value   12/16/2020 440 (H)     Lab Results   Component Value Date     03/03/2021    A1C 5.7 (H) 03/03/2021     Lab Results   Component Value Date    TSH 1.820 12/16/2020       No follow-ups on file.

## 2022-12-20 NOTE — TELEPHONE ENCOUNTER
Please verify medications and use with pt before refill.       Future Appointments   Date Time Provider Eunice Graves   1/3/2023  8:30 AM Domingo Tineo MD [facility] [place]

## 2022-12-20 NOTE — TELEPHONE ENCOUNTER
Pt contacted-    Per pt- Hydroxyzine, Propranolol, Amlodipine, and Hydralazine being prescribed and managed by DR. Nydia Rizo. Pt urged to contact prescribing provider for refills. Pt agreed to do so.

## 2022-12-21 RX ORDER — ALPRAZOLAM 0.5 MG/1
0.5 TABLET ORAL 3 TIMES DAILY PRN
Qty: 30 TABLET | Refills: 0 | Status: SHIPPED | OUTPATIENT
Start: 2022-12-21

## 2023-01-03 ENCOUNTER — TELEPHONE (OUTPATIENT)
Dept: FAMILY MEDICINE CLINIC | Facility: CLINIC | Age: 28
End: 2023-01-03

## 2023-01-03 NOTE — TELEPHONE ENCOUNTER
LM that patient missed an appt today with Dr. Kota Casey also that there is a 40.00 no show fee for missing appointments

## 2023-01-18 ENCOUNTER — MED REC SCAN ONLY (OUTPATIENT)
Dept: NEPHROLOGY | Facility: CLINIC | Age: 28
End: 2023-01-18

## 2023-01-24 ENCOUNTER — TELEPHONE (OUTPATIENT)
Dept: FAMILY MEDICINE CLINIC | Facility: CLINIC | Age: 28
End: 2023-01-24

## 2023-01-24 NOTE — TELEPHONE ENCOUNTER
Pt states she was home on her lunch break. Pt states she had an episode of diarrhea  When pt was wiping, pt states she noticed she had what appeared like \"stretched out bubble gum\" unknown tissue in the toilet paper. Pt states it was flesh colored, two inches long, rubbery, and slimy. Pt states she has stage 5 kidney failure,  Pt states she always has some abdominal pain, her pain is not increased. No vomiting reported. No fever. No unusual pain reported. Asked pt if it looked like a worm- pt stated it was possible. Pt scheduled appt with EL tomorrow. Asked pt to place object in plastic bag to bring to visit.     Future Appointments   Date Time Provider Eunice Graves   1/25/2023  9:00 AM JOHNSON Razo [facility] [place] [facility]

## 2023-01-25 ENCOUNTER — OFFICE VISIT (OUTPATIENT)
Dept: FAMILY MEDICINE CLINIC | Facility: CLINIC | Age: 28
End: 2023-01-25
Payer: COMMERCIAL

## 2023-01-25 VITALS
HEIGHT: 68 IN | OXYGEN SATURATION: 99 % | HEART RATE: 87 BPM | DIASTOLIC BLOOD PRESSURE: 100 MMHG | RESPIRATION RATE: 18 BRPM | WEIGHT: 252 LBS | BODY MASS INDEX: 38.19 KG/M2 | SYSTOLIC BLOOD PRESSURE: 132 MMHG | TEMPERATURE: 97 F

## 2023-01-25 DIAGNOSIS — K92.1 BLOOD IN STOOL: Primary | ICD-10-CM

## 2023-01-25 DIAGNOSIS — N18.5 STAGE 5 CHRONIC KIDNEY DISEASE (HCC): ICD-10-CM

## 2023-01-25 DIAGNOSIS — I10 ESSENTIAL HYPERTENSION: ICD-10-CM

## 2023-01-25 DIAGNOSIS — R19.5 STOOL CONTENTS FINDING, ABNORMAL: ICD-10-CM

## 2023-01-25 PROCEDURE — 3080F DIAST BP >= 90 MM HG: CPT | Performed by: NURSE PRACTITIONER

## 2023-01-25 PROCEDURE — 88305 TISSUE EXAM BY PATHOLOGIST: CPT | Performed by: NURSE PRACTITIONER

## 2023-01-25 PROCEDURE — 3075F SYST BP GE 130 - 139MM HG: CPT | Performed by: NURSE PRACTITIONER

## 2023-01-25 PROCEDURE — 99214 OFFICE O/P EST MOD 30 MIN: CPT | Performed by: NURSE PRACTITIONER

## 2023-01-25 PROCEDURE — 3008F BODY MASS INDEX DOCD: CPT | Performed by: NURSE PRACTITIONER

## 2023-01-25 NOTE — PATIENT INSTRUCTIONS
Monitor blood pressure -     Clear liquid diet, avoid dairy and acidic, spicy, and fatty foods. When ready for food try BRAT diet (bananas, rice, applesauce, tea/toast) and crackers. Follow up if symptoms persist or increase (fever, blood in your stool, dark black/sticky stools, or severe abdominal pain).      Will send out tissue for pathology

## 2023-01-31 RX ORDER — CALCITRIOL 0.25 UG/1
CAPSULE, LIQUID FILLED ORAL
Refills: 0 | OUTPATIENT
Start: 2023-01-31

## 2023-01-31 NOTE — TELEPHONE ENCOUNTER
Pt called back. Pt states she gets her calcitriol from nephrologist in [facility]. Pt was not sure why PCP received RX. RX declined. Pt agreed to f/u with ordering provider.

## 2023-01-31 NOTE — TELEPHONE ENCOUNTER
Future appt:    Last Appointment with provider:   1/3/2023  Last appointment at [facility] [place]:  1/25/2023      Calcitriol noted as external      LM for pt    Cholesterol, Total (mg/dL)   Date Value   12/16/2020 284 (H)     HDL Cholesterol (mg/dL)   Date Value   12/16/2020 49     LDL Cholesterol (no units)   Date Value   12/16/2020      Comment:     Unable to calculate LDL due to Triglycerides >400.0 mg/dL      Desirable <100 mg/dL   Borderline 100-129 mg/dL   High     >=130mg/dL         Triglycerides (mg/dL)   Date Value   12/16/2020 440 (H)     Lab Results   Component Value Date     03/03/2021    A1C 5.7 (H) 03/03/2021     Lab Results   Component Value Date    TSH 1.820 12/16/2020       No follow-ups on file.

## 2023-03-30 DIAGNOSIS — F41.0 EPISODIC PAROXYSMAL ANXIETY DISORDER: ICD-10-CM

## 2023-03-30 NOTE — TELEPHONE ENCOUNTER
Future appt:  Patient due for physical. Sent [employer] message 3/30/23  Last Appointment with provider:  9/20/22 contraception   Last appointment at [facility]:  1/25/2023  Cholesterol, Total (mg/dL)   Date Value   12/16/2020 284 (H)     HDL Cholesterol (mg/dL)   Date Value   12/16/2020 49     LDL Cholesterol (no units)   Date Value   12/16/2020      Comment:     Unable to calculate LDL due to Triglycerides >400.0 mg/dL      Desirable <100 mg/dL   Borderline 100-129 mg/dL   High     >=130mg/dL         Triglycerides (mg/dL)   Date Value   12/16/2020 440 (H)     Lab Results   Component Value Date     03/03/2021    A1C 5.7 (H) 03/03/2021     Lab Results   Component Value Date    TSH 1.820 12/16/2020       No follow-ups on file.

## 2023-04-14 NOTE — TELEPHONE ENCOUNTER
Alprazolam: 12/21/22    No return date given. Future appt:    Last Appointment with provider:    10/25/22 Ezequiel Head  PCP  [place] appointment [place] [facility]:    1/25/2023 Brianna/JOHNSON  Cholesterol, Total (mg/dL)   Date Value   12/16/2020 284 (H)     HDL Cholesterol (mg/dL)   Date Value   12/16/2020 49     LDL Cholesterol (no units)   Date Value   12/16/2020      Comment:     Unable to calculate LDL due to Triglycerides >400.0 mg/dL      Desirable <100 mg/dL   Borderline 100-129 mg/dL   High     >=130mg/dL         Triglycerides (mg/dL)   Date Value   12/16/2020 440 (H)     Lab Results   Component Value Date     03/03/2021    A1C 5.7 (H) 03/03/2021     Lab Results   Component Value Date    TSH 1.820 12/16/2020       No follow-ups on file.

## 2023-04-16 RX ORDER — ALPRAZOLAM 0.5 MG/1
0.5 TABLET ORAL 3 TIMES DAILY PRN
Qty: 30 TABLET | Refills: 0 | Status: SHIPPED | OUTPATIENT
Start: 2023-04-16

## 2023-04-24 DIAGNOSIS — F41.0 EPISODIC PAROXYSMAL ANXIETY DISORDER: ICD-10-CM

## 2023-04-24 RX ORDER — ALPRAZOLAM 0.5 MG/1
0.5 TABLET ORAL 3 TIMES DAILY PRN
Qty: 30 TABLET | Refills: 0 | OUTPATIENT
Start: 2023-04-24

## 2023-04-24 NOTE — TELEPHONE ENCOUNTER
Future appt:    Last Appointment with provider:   1/3/2023  Last appointment at [facility]:  1/25/2023    Alprazolam refilled 4/16/23 for #30, 0 refills  Request too soon, RX declined          Cholesterol, Total (mg/dL)   Date Value   12/16/2020 284 (H)     HDL Cholesterol (mg/dL)   Date Value   12/16/2020 49     LDL Cholesterol (no units)   Date Value   12/16/2020      Comment:     Unable to calculate LDL due to Triglycerides >400.0 mg/dL      Desirable <100 mg/dL   Borderline 100-129 mg/dL   High     >=130mg/dL         Triglycerides (mg/dL)   Date Value   12/16/2020 440 (H)     Lab Results   Component Value Date     03/03/2021    A1C 5.7 (H) 03/03/2021     Lab Results   Component Value Date    TSH 1.820 12/16/2020       No follow-ups on file.

## 2023-05-21 DIAGNOSIS — F41.0 EPISODIC PAROXYSMAL ANXIETY DISORDER: ICD-10-CM

## 2023-05-21 RX ORDER — ALPRAZOLAM 0.5 MG/1
0.5 TABLET ORAL 3 TIMES DAILY PRN
Qty: 30 TABLET | Refills: 0 | Status: CANCELLED | OUTPATIENT
Start: 2023-05-21

## 2023-05-24 DIAGNOSIS — F41.0 EPISODIC PAROXYSMAL ANXIETY DISORDER: ICD-10-CM

## 2023-05-24 RX ORDER — ALPRAZOLAM 0.5 MG/1
0.5 TABLET ORAL 3 TIMES DAILY PRN
Qty: 30 TABLET | Refills: 0 | Status: SHIPPED | OUTPATIENT
Start: 2023-05-24

## 2023-05-24 NOTE — TELEPHONE ENCOUNTER
Alprazolam: 4/16/23    Future appt: Your appointments     Date & Time Appointment Department [facility])    May 31, 2023  9:00 AM CDT Physical - Established with JOHNSON Santa [address] [place] ([place])            [address]  [address] [address] [phone]  [phone]        Last Appointment with provider:   1/3/2023  Last appointment at [facility]:  1/25/2023  Cholesterol, Total (mg/dL)   Date Value   12/16/2020 284 (H)     HDL Cholesterol (mg/dL)   Date Value   12/16/2020 49     LDL Cholesterol (no units)   Date Value   12/16/2020      Comment:     Unable to calculate LDL due to Triglycerides >400.0 mg/dL      Desirable <100 mg/dL   Borderline 100-129 mg/dL   High     >=130mg/dL         Triglycerides (mg/dL)   Date Value   12/16/2020 440 (H)     Lab Results   Component Value Date     03/03/2021    A1C 5.7 (H) 03/03/2021     Lab Results   Component Value Date    TSH 1.820 12/16/2020       No follow-ups on file.

## 2023-05-24 NOTE — TELEPHONE ENCOUNTER
Pt would like a refill on her Alprazolam 0.5 mg to get her through her px appt.        Your appointments     Date & Time Appointment Department [facility])    May 31, 2023  9:00 AM CDT Physical - Established with Kyle Olivia, APRN [address], [address] [facility] [place], [facility] ([facility])            [address], [place]  [facility] [address] [phone]  [phone]

## 2023-05-31 ENCOUNTER — OFFICE VISIT (OUTPATIENT)
Dept: FAMILY MEDICINE CLINIC | Facility: CLINIC | Age: 28
End: 2023-05-31
Payer: COMMERCIAL

## 2023-05-31 VITALS
OXYGEN SATURATION: 98 % | TEMPERATURE: 98 F | HEIGHT: 67 IN | SYSTOLIC BLOOD PRESSURE: 122 MMHG | BODY MASS INDEX: 39.43 KG/M2 | WEIGHT: 251.19 LBS | HEART RATE: 83 BPM | DIASTOLIC BLOOD PRESSURE: 74 MMHG | RESPIRATION RATE: 18 BRPM

## 2023-05-31 DIAGNOSIS — Z00.00 WELLNESS EXAMINATION: Primary | ICD-10-CM

## 2023-05-31 DIAGNOSIS — I10 ESSENTIAL HYPERTENSION: ICD-10-CM

## 2023-05-31 DIAGNOSIS — F41.9 ANXIETY AND DEPRESSION: ICD-10-CM

## 2023-05-31 DIAGNOSIS — F32.A ANXIETY AND DEPRESSION: ICD-10-CM

## 2023-05-31 DIAGNOSIS — E78.2 ELEVATED CHOLESTEROL WITH ELEVATED TRIGLYCERIDES: ICD-10-CM

## 2023-05-31 DIAGNOSIS — N18.5 STAGE 5 CHRONIC KIDNEY DISEASE (HCC): ICD-10-CM

## 2023-05-31 PROCEDURE — 3008F BODY MASS INDEX DOCD: CPT | Performed by: NURSE PRACTITIONER

## 2023-05-31 PROCEDURE — 3074F SYST BP LT 130 MM HG: CPT | Performed by: NURSE PRACTITIONER

## 2023-05-31 PROCEDURE — 3078F DIAST BP <80 MM HG: CPT | Performed by: NURSE PRACTITIONER

## 2023-05-31 PROCEDURE — 99395 PREV VISIT EST AGE 18-39: CPT | Performed by: NURSE PRACTITIONER

## 2023-05-31 RX ORDER — ESCITALOPRAM OXALATE 10 MG/1
10 TABLET ORAL DAILY
Qty: 30 TABLET | Refills: 0 | Status: SHIPPED | OUTPATIENT
Start: 2023-05-31

## 2023-05-31 NOTE — PATIENT INSTRUCTIONS
Get cholesterol levels checked with next blood draw at [address] [place] [address] with [facility]. Take a half a tablet for the first 6 days then go to 1 tablet    Schedule appointment with Luli Matta, counselor. Follow up in 3 weeks - sooner if needed.

## 2023-06-27 DIAGNOSIS — F41.0 EPISODIC PAROXYSMAL ANXIETY DISORDER: ICD-10-CM

## 2023-06-29 RX ORDER — ALPRAZOLAM 0.5 MG/1
0.5 TABLET ORAL 3 TIMES DAILY PRN
Qty: 30 TABLET | Refills: 0 | Status: SHIPPED | OUTPATIENT
Start: 2023-06-29

## 2023-07-21 ENCOUNTER — TELEPHONE (OUTPATIENT)
Dept: FAMILY MEDICINE CLINIC | Facility: CLINIC | Age: 28
End: 2023-07-21

## 2023-07-21 NOTE — TELEPHONE ENCOUNTER
[facility] has requested HPV testing records to be faxed to [phone]  Request sent to [employer] 7/21/23

## 2023-08-09 DIAGNOSIS — F41.0 EPISODIC PAROXYSMAL ANXIETY DISORDER: ICD-10-CM

## 2023-08-09 RX ORDER — ALPRAZOLAM 0.5 MG/1
0.5 TABLET ORAL 3 TIMES DAILY PRN
Qty: 30 TABLET | Refills: 0 | Status: SHIPPED | OUTPATIENT
Start: 2023-08-09

## 2023-08-09 NOTE — TELEPHONE ENCOUNTER
Future appt:    Last Appointment with provider:   7/3/23 Medication follow up. Recheck in 6 months. [place] [facility] [place]:  7/3/2023  Cholesterol, Total (mg/dL)   Date Value   12/16/2020 284 (H)     HDL Cholesterol (mg/dL)   Date Value   12/16/2020 49     LDL Cholesterol (no units)   Date Value   12/16/2020      Comment:     Unable to calculate LDL due to Triglycerides >400.0 mg/dL      Desirable <100 mg/dL   Borderline 100-129 mg/dL   High     >=130mg/dL         Triglycerides (mg/dL)   Date Value   12/16/2020 440 (H)     Lab Results   Component Value Date     03/03/2021    A1C 5.7 (H) 03/03/2021     Lab Results   Component Value Date    TSH 1.820 12/16/2020       No follow-ups on file.

## 2023-09-06 ENCOUNTER — TELEPHONE (OUTPATIENT)
Dept: FAMILY MEDICINE CLINIC | Facility: CLINIC | Age: 28
End: 2023-09-06

## 2023-09-06 ENCOUNTER — PATIENT MESSAGE (OUTPATIENT)
Dept: FAMILY MEDICINE CLINIC | Facility: CLINIC | Age: 28
End: 2023-09-06

## 2023-09-06 DIAGNOSIS — F41.0 EPISODIC PAROXYSMAL ANXIETY DISORDER: ICD-10-CM

## 2023-09-06 NOTE — TELEPHONE ENCOUNTER
Pt had pap in Oct 2019. Pt states she needs a copy of her pap for her transplant team at [facility]. Pt informed she can see copy via her my chart and submit copy that way. Pt was able to find report and agreed to send report.

## 2023-09-06 NOTE — TELEPHONE ENCOUNTER
Please have Anabel check at [facility] since she had an IUD inserted there and planned to have her paps there. Thank you.

## 2023-09-06 NOTE — TELEPHONE ENCOUNTER
Pt is getting a kidney transplant but needs results for an HPV test- wants to know if she was ever tested that here

## 2023-09-07 ENCOUNTER — TELEPHONE (OUTPATIENT)
Dept: FAMILY MEDICINE CLINIC | Facility: CLINIC | Age: 28
End: 2023-09-07

## 2023-09-09 ENCOUNTER — OFFICE VISIT (OUTPATIENT)
Dept: FAMILY MEDICINE CLINIC | Facility: CLINIC | Age: 28
End: 2023-09-09
Payer: COMMERCIAL

## 2023-09-09 VITALS
BODY MASS INDEX: 38.77 KG/M2 | WEIGHT: 247 LBS | HEART RATE: 78 BPM | HEIGHT: 67 IN | RESPIRATION RATE: 18 BRPM | TEMPERATURE: 97 F | DIASTOLIC BLOOD PRESSURE: 60 MMHG | OXYGEN SATURATION: 97 % | SYSTOLIC BLOOD PRESSURE: 100 MMHG

## 2023-09-09 DIAGNOSIS — Z12.4 SCREENING FOR CERVICAL CANCER: Primary | ICD-10-CM

## 2023-09-09 PROCEDURE — 3078F DIAST BP <80 MM HG: CPT | Performed by: NURSE PRACTITIONER

## 2023-09-09 PROCEDURE — 88175 CYTOPATH C/V AUTO FLUID REDO: CPT | Performed by: NURSE PRACTITIONER

## 2023-09-09 PROCEDURE — 3074F SYST BP LT 130 MM HG: CPT | Performed by: NURSE PRACTITIONER

## 2023-09-09 PROCEDURE — 87624 HPV HI-RISK TYP POOLED RSLT: CPT | Performed by: NURSE PRACTITIONER

## 2023-09-09 PROCEDURE — 3008F BODY MASS INDEX DOCD: CPT | Performed by: NURSE PRACTITIONER

## 2023-09-09 PROCEDURE — 99212 OFFICE O/P EST SF 10 MIN: CPT | Performed by: NURSE PRACTITIONER

## 2023-09-10 RX ORDER — ALPRAZOLAM 0.5 MG/1
0.5 TABLET ORAL 3 TIMES DAILY PRN
Qty: 30 TABLET | Refills: 0 | Status: SHIPPED | OUTPATIENT
Start: 2023-09-10

## 2023-09-11 LAB — HPV I/H RISK 1 DNA SPEC QL NAA+PROBE: NEGATIVE

## 2023-09-13 ENCOUNTER — TELEPHONE (OUTPATIENT)
Dept: FAMILY MEDICINE CLINIC | Facility: CLINIC | Age: 28
End: 2023-09-13

## 2024-11-20 ENCOUNTER — MED REC SCAN ONLY (OUTPATIENT)
Dept: NEPHROLOGY | Facility: CLINIC | Age: 29
End: 2024-11-20

## 2025-04-09 NOTE — TELEPHONE ENCOUNTER
COVID vaccine recommended    Flu shot recommended.    Take the iron with vitamin C  Continue the aspirin and monitor for side effects including bleeding  Continue to see hematology and neurology  Get your labs done. You should be fasting for at least 10 hours. If you take a multivitamin with Biotin or any biotin product it should be held for 3 days prior to getting your labs done.   My fitness pal for nutrition as needed  Follow up in 1 year or sooner as needed    Tips to Control Acid Reflux    To control acid reflux, you’ll need to make some basic diet and lifestyle changes. The simple steps outlined below may be all you’ll need to ease discomfort.  Watch what you eat  Avoid fatty foods and spicy foods.  Eat fewer acidic foods, such as citrus and tomato-based foods. These can increase symptoms.  Limit drinking alcohol, caffeine, and fizzy beverages. All increase acid reflux.  Try limiting chocolate, peppermint, and spearmint. These can worsen acid reflux in some people.  Watch when you eat  Avoid lying down for 3 hours after eating.  Do not snack before going to bed.  Raise your head  Raising your head and upper body by 4 to 6 inches helps limit reflux when you’re lying down. Put blocks under the head of your bed frame to raise it.  Other changes  Lose weight, if you need to  Don’t exercise near bedtime  Avoid tight-fitting clothes  Limit aspirin and ibuprofen  Stop smoking   Date Last Reviewed: 7/1/2016 © 2000-2019 [employer] [address] [place] [address], [place] [address]. All rights reserved. This information is not intended as a substitute for professional medical care. Always follow your healthcare professional's instructions.     Increase your hydration, be active, eat fruits and veggies, drink hot decaf liquids. If no improvement start metamucil daily. If no improvement start mirilax daily as needed. If still no improvement do prune punch (4oz of orange juice or apple if you have acid reflux, 4 oz  Future appt:    Last Appointment with provider:   12/17/2021physical  Last appointment at [facility]:  12/17/2021  Cholesterol, Total (mg/dL)   Date Value   12/16/2020 284 (H)     HDL Cholesterol (mg/dL)   Date Value   12/16/2020 49     LDL Cholesterol (no units)   Date Value   12/16/2020      Comment:     Unable to calculate LDL due to Triglycerides >400.0 mg/dL      Desirable <100 mg/dL   Borderline 100-129 mg/dL   High     >=130mg/dL         Triglycerides (mg/dL)   Date Value   12/16/2020 440 (H)     Lab Results   Component Value Date     03/03/2021    A1C 5.7 (H) 03/03/2021     Lab Results   Component Value Date    TSH 1.820 12/16/2020       No follow-ups on file. of prune juice heated in the microwave together until hot and add a dose of milk of magnesia from over the counter) daily as needed.   Constipation (Adult)  Constipation means that you have bowel movements that are less frequent than usual. Stools often become very hard and difficult to pass.  Constipation is very common. At some point in life, it affects almost everyone. Since everyone's bowel habits are different, what is constipation to one person may not be to another. Your healthcare provider may do tests to diagnose constipation. It depends on what he or she finds when evaluating you.    Symptoms of constipation include:  Abdominal pain  Bloating  Vomiting  Painful bowel movements  Itching, swelling, bleeding, or pain around the anus  Causes  Constipation can have many causes. These include:  Diet low in fiber  Too much dairy  Not drinking enough liquids  Lack of exercise or physical activity (especially true for older adults)  Changes in lifestyle or daily routine, including pregnancy, aging, work, and travel  Frequent use or misuse of laxatives  Ignoring the urge to have a bowel movement or delaying it until later  Medicines, such as certain prescription pain medicines, iron supplements, antacids, certain antidepressants, and calcium supplements  Diseases like irritable bowel syndrome, bowel obstructions, stroke, diabetes, thyroid disease, Parkinson disease, hemorrhoids, and colon cancer  Complications  Potential complications of constipation can include:  Hemorrhoids  Rectal bleeding from hemorrhoids or anal fissures (skin tears)  Hernias  Dependency on laxatives  Chronic constipation  Fecal impaction, a severe form of constipation in which a large amount of hard stool is in your rectum that you can't pass  Bowel obstruction or perforation  Home care  All treatment should be done after talking with your healthcare provider. This is especially true if you have another medical problems, are taking  prescription medicines, or are an older adult. Treatment most often involves lifestyle changes. You may also need medicines. Your healthcare provider will tell you which will work best for you. Follow the advice below to help avoid this problem in the future.  Lifestyle changes  These lifestyle changes can help prevent constipation:  Diet. Eat a high-fiber diet, with fresh fruit and vegetables, and reduce dairy intake, meats, and processed foods  Fluids. It's important to get enough fluids each day. Drink plenty of water when you eat more fiber. If you are on diet that limits the amount of fluid you can have, talk about this with your healthcare provider.  Regular exercise. Check with your healthcare provider first.  Medicines  Take any medicines as directed. Some laxatives are safe to use only every now and then. Others can be taken on a regular basis. While laxatives don't cause bowel dependence, they are treating the symptoms. So your constipation may return if you don't make other changes. Talk with your healthcare provider or pharmacist if you have questions.  Prescription pain medicines can cause constipation. If you are taking this kind of medicine, ask your healthcare provider if you should also take a stool softener.  Medicines you may take to treat constipation include:  Fiber supplements  Stool softeners  Laxatives  Enemas  Rectal suppositories  Follow-up care  Follow up with your healthcare provider if symptoms don't get better in the next few days. You may need to have more tests or see a specialist.  Call 911  Call 911 if any of these occur:  Trouble breathing  Stiff, rigid abdomen that is severely painful to touch  Confusion  Fainting or loss of consciousness  Rapid heart rate  Chest pain  When to seek medical advice  Call your healthcare provider right away if any of these occur:  Fever of 100.4°F (38°C) or higher, or as directed by your healthcare provider  Failure to resume normal bowel  movements  Pain in your abdomen or back gets worse  Nausea or vomiting  Swelling in your abdomen  Blood in the stool  Black, tarry stool  Involuntary weight loss  Weakness  [employer] last reviewed this educational content on 6/1/2018  © 4122-8921 [employer]. All rights reserved. This information is not intended as a substitute for professional medical care. Always follow your healthcare professional's instructions.

## 2025-07-01 ENCOUNTER — MED REC SCAN ONLY (OUTPATIENT)
Dept: NEPHROLOGY | Facility: CLINIC | Age: 30
End: 2025-07-01

## (undated) DIAGNOSIS — E28.2 PCOS (POLYCYSTIC OVARIAN SYNDROME): ICD-10-CM

## (undated) DIAGNOSIS — F41.0 EPISODIC PAROXYSMAL ANXIETY DISORDER: ICD-10-CM

## (undated) DIAGNOSIS — Z88.9 MULTIPLE ALLERGIES: Primary | ICD-10-CM

## (undated) NOTE — MR AVS SNAPSHOT
[address]  [place] [phone]  [phone]               Thank you for choosing [facility]   We are glad to serve you and happy to provide you with this summary o Allergies as of May 30, 2017     No Known Allergies                 Today's Vital Signs     BP Pulse Temp Height Weight BMI    132/98 mmHg 106 98.2 °F (36.8 °C) (Tympanic) 67\" 241 lb 3.2 oz 37.77 kg/m2         Current Medications          This list is acc DASH eating plan Adopt a diet rich in fruits, vegetables, and low fat dairy products with reduced content of saturated and total fat.    Dietary sodium reduction Reduce dietary sodium intake to <= 100 mmol per day (2.4 g sodium or 6 g sodium chloride)   Aer You don’t need to join a gym. Home exercises work great.  Put more priority on exercise in your life                    Visit [facility] online at  [email or web address]

## (undated) NOTE — Clinical Note
Hi, Dr. Guillermina Ovalle and Monty Raines! Thank you for referring Ms. Pasquale Padilla for rheumatologic evaluation. Please see the discussion portion of my note and let me know if you have any questions.      Kai Gaspar, DO  [facility]  2/17/2021

## (undated) NOTE — Clinical Note
Hello again,  I wanted to refer another patient -young lady with + EVERTON and + SCL 70 titers. CKD; stg IV. Let me know if you can see her.  Thanks  CONNER

## (undated) NOTE — LETTER
Patient Name: Collin Rooney  : 3/22/1995  MRN: [id number]  Patient Address: [address] [place] [address]      Coronavirus Disease 2019 (COVID-19)     [facility] is committed to the safety and well-being of our patients, members, em carefully. If your symptoms get worse, call your healthcare provider immediately. 3. Get rest and stay hydrated.    4. If you have a medical appointment, call the healthcare provider ahead of time and tell them that you have or may have COVID-19.  5. For m of fever-reducing medications; and  · Improvement in respiratory symptoms (e.g., cough, shortness of breath); and  · At least 10 days have passed since symptoms first appeared OR if asymptomatic patient or date of symptom onset is unclear then use 10 days donors must:    · Have had a confirmed diagnosis of COVID-19  · Be symptom-free for at least 14 days*    *Some people will be required to have a repeat COVID-19 test in order to be eligible to donate.  If you’re instructed by Kriss that a repeat test is r